# Patient Record
Sex: MALE | Race: BLACK OR AFRICAN AMERICAN | NOT HISPANIC OR LATINO | Employment: FULL TIME | URBAN - METROPOLITAN AREA
[De-identification: names, ages, dates, MRNs, and addresses within clinical notes are randomized per-mention and may not be internally consistent; named-entity substitution may affect disease eponyms.]

---

## 2017-09-06 ENCOUNTER — OFFICE VISIT (OUTPATIENT)
Dept: FAMILY MEDICINE | Facility: CLINIC | Age: 47
End: 2017-09-06
Payer: MEDICAID

## 2017-09-06 VITALS
SYSTOLIC BLOOD PRESSURE: 132 MMHG | OXYGEN SATURATION: 100 % | HEIGHT: 66 IN | BODY MASS INDEX: 29.57 KG/M2 | HEART RATE: 61 BPM | TEMPERATURE: 97.8 F | WEIGHT: 184 LBS | DIASTOLIC BLOOD PRESSURE: 82 MMHG

## 2017-09-06 DIAGNOSIS — Z00.00 ROUTINE GENERAL MEDICAL EXAMINATION AT A HEALTH CARE FACILITY: Primary | ICD-10-CM

## 2017-09-06 PROCEDURE — 99396 PREV VISIT EST AGE 40-64: CPT | Performed by: INTERNAL MEDICINE

## 2017-09-06 ASSESSMENT — PAIN SCALES - GENERAL: PAINLEVEL: NO PAIN (0)

## 2017-09-06 NOTE — NURSING NOTE
"Chief Complaint   Patient presents with     Physical       Initial /90 (BP Location: Right arm, Patient Position: Chair, Cuff Size: Adult Regular)  Pulse 61  Temp 97.8  F (36.6  C) (Oral)  Ht 5' 6\" (1.676 m)  Wt 184 lb (83.5 kg)  SpO2 100%  BMI 29.7 kg/m2 Estimated body mass index is 29.7 kg/(m^2) as calculated from the following:    Height as of this encounter: 5' 6\" (1.676 m).    Weight as of this encounter: 184 lb (83.5 kg).  Medication Reconciliation: complete   Amalia Tinoco MA      "

## 2017-09-06 NOTE — MR AVS SNAPSHOT
"              After Visit Summary   2017    Cornel Monte    MRN: [id number]           Patient Information     Date Of Birth          1970        Visit Information        Provider Department      2017 5:00 PM Nathan Carson MD [facility]         Follow-ups after your visit        Who to contact     If you have questions or need follow up information about today's clinic visit or your schedule please contact [facility] directly at [phone].  Normal or non-critical lab and imaging results will be communicated to you by MyChart, letter or phone within 4 business days after the clinic has received the results. If you do not hear from us within 7 days, please contact the clinic through [employer] or phone. If you have a critical or abnormal lab result, we will notify you by phone as soon as possible.  Submit refill requests through [employer] or call your pharmacy and they will forward the refill request to us. Please allow 3 business days for your refill to be completed.          Additional Information About Your Visit        MyChart Information     [employer] lets you send messages to your doctor, view your test results, renew your prescriptions, schedule appointments and more. To sign up, go to [email or web address] . Click on \"Log in\" on the left side of the screen, which will take you to the Welcome page. Then click on \"Sign up Now\" on the right side of the page.     You will be asked to enter the access code listed below, as well as some personal information. Please follow the directions to create your username and password.     Your access code is: [email or web address]  Expires: 2017  6:55 PM     Your access code will  in 90 days. If you need help or a new code, please call your [facility] or [phone].        Care EveryWhere ID     This is your Care EveryWhere ID. This could be used by other organizations to access your [place] " "medical records  [id number]        Your Vitals Were     Pulse Temperature Height Pulse Oximetry BMI (Body Mass Index)       61 97.8  F (36.6  C) (Oral) 5' 6\" (1.676 m) 100% 29.7 kg/m2        Blood Pressure from Last 3 Encounters:   09/06/17 132/82   09/02/16 126/78   04/23/15 133/86    Weight from Last 3 Encounters:   09/06/17 184 lb (83.5 kg)   09/02/16 184 lb (83.5 kg)   04/23/15 176 lb (79.8 kg)              Today, you had the following     No orders found for display       Primary Care Provider Office Phone # Fax #    Nathan Carson [phone]       [address] [facility] [address]        Equal Access to Services     LESLY WHEELER : Bhargav diazo Sowilliam, waaxda luqadaha, qaybta kaalmada adeamandayaregulo, kiel georges . So [facility] [phone].    ATENCIÓN: Si habla español, tiene a garcia disposición servicios gratuitos de asistencia lingüística. Sandrine al [phone].    We comply with applicable federal civil rights laws and [place] laws. We do not discriminate on the basis of race, color, national origin, age, disability sex, sexual orientation or gender identity.            Thank you!     Thank you for choosing [facility]  for your care. Our goal is always to provide you with excellent care. Hearing back from our patients is one way we can continue to improve our services. Please take a few minutes to complete the written survey that you may receive in the mail after your visit with us. Thank you!             Your Updated Medication List - Protect others around you: Learn how to safely use, store and throw away your medicines at www.disposemymeds.org.          This list is accurate as of: 9/6/17  6:55 PM.  Always use your most recent med list.                   Brand Name Dispense Instructions for use Diagnosis    acetaminophen 500 MG tablet    TYLENOL    100 tablet    Take 2 tablets (1,000 mg) by mouth every 6 hours as needed " for mild pain    Right ankle pain

## 2017-09-06 NOTE — PROGRESS NOTES
SUBJECTIVE:   CC: Cornel Monte is an 46 year old male who presents for preventative health visit.     Physical   Annual:     Getting at least 3 servings of Calcium per day::  Yes    Bi-annual eye exam::  Yes    Dental care twice a year::  NO    Sleep apnea or symptoms of sleep apnea::  None    Diet::  Regular (no restrictions)    Frequency of exercise::  1 day/week    Duration of exercise::  30-45 minutes    Taking medications regularly::  Yes    Medication side effects::  None    Additional concerns today::  No    Health doing well.        Today's PHQ-2 Score: PHQ-2 ( 1999 Pfizer) 9/6/2017   Q1: Little interest or pleasure in doing things 0   Q2: Feeling down, depressed or hopeless 0   PHQ-2 Score 0   Q1: Little interest or pleasure in doing things Not at all   Q2: Feeling down, depressed or hopeless Not at all   PHQ-2 Score 0       Abuse: Current or Past(Physical, Sexual or Emotional)- No  Do you feel safe in your environment - Yes    Social History   Substance Use Topics     Smoking status: Never Smoker     Smokeless tobacco: Never Used      Comment: smoke free household.     Alcohol use No     The patient does not drink >3 drinks per day nor >7 drinks per week.    Last PSA:   PSA   Date Value Ref Range Status   04/27/2009 0.82 0 - 4 ug/L Final       Reviewed orders with patient. Reviewed health maintenance and updated orders accordingly - Yes  Labs reviewed in EPIC  BP Readings from Last 3 Encounters:   09/06/17 132/82   09/02/16 126/78   04/23/15 133/86    Wt Readings from Last 3 Encounters:   09/06/17 184 lb (83.5 kg)   09/02/16 184 lb (83.5 kg)   04/23/15 176 lb (79.8 kg)                  Patient Active Problem List   Diagnosis     CARDIOVASCULAR SCREENING; LDL GOAL LESS THAN 160     Preglaucoma     Vitamin D deficiency disease     Past Surgical History:   Procedure Laterality Date     NO HISTORY OF SURGERY         Social History   Substance Use Topics     Smoking status: Never Smoker     Smokeless  tobacco: Never Used      Comment: smoke free household.     Alcohol use No     Family History   Problem Relation Age of Onset     Hypertension Father      Family History Negative Other      CANCER No family hx of      DIABETES No family hx of      CEREBROVASCULAR DISEASE No family hx of      Thyroid Disease No family hx of      Glaucoma No family hx of      Macular Degeneration No family hx of          Current Outpatient Prescriptions   Medication Sig Dispense Refill     acetaminophen (TYLENOL) 500 MG tablet Take 2 tablets (1,000 mg) by mouth every 6 hours as needed for mild pain 100 tablet 0           Reviewed and updated as needed this visit by clinical staffTobacco  Allergies  Meds  Med Hx  Surg Hx  Fam Hx  Soc Hx        Reviewed and updated as needed this visit by Provider              ROS:  C: NEGATIVE for fever, chills, change in weight  I: NEGATIVE for worrisome rashes, moles or lesions  E: NEGATIVE for vision changes or irritation  ENT: NEGATIVE for ear, mouth and throat problems  R: NEGATIVE for significant cough or SOB  CV: NEGATIVE for chest pain, palpitations or peripheral edema  GI: NEGATIVE for nausea, abdominal pain, heartburn, or change in bowel habits   male: negative for dysuria, hematuria, decreased urinary stream, erectile dysfunction, urethral discharge  M: NEGATIVE for significant arthralgias or myalgia  N: NEGATIVE for weakness, dizziness or paresthesias  P: NEGATIVE for changes in mood or affect    OBJECTIVE:   There were no vitals taken for this visit.    EXAM:  GENERAL: healthy, alert and no distress  NECK: no adenopathy, no asymmetry, masses, or scars and thyroid normal to palpation  RESP: lungs clear to auscultation - no rales, rhonchi or wheezes  CV: regular rate and rhythm, normal S1 S2, no S3 or S4, no murmur, click or rub, no peripheral edema and peripheral pulses strong  ABDOMEN: soft, nontender, no hepatosplenomegaly, no masses and bowel sounds normal  MS: no gross  "musculoskeletal defects noted, no edema    ASSESSMENT/PLAN:   No diagnosis found.    COUNSELING:   Reviewed preventive health counseling, as reflected in patient instructions       Regular exercise       Healthy diet/nutrition       Vision screening       Hearing screening         reports that he has never smoked. He has never used smokeless tobacco.      Estimated body mass index is 29.7 kg/(m^2) as calculated from the following:    Height as of 9/2/16: 5' 6\" (1.676 m).    Weight as of 9/2/16: 184 lb (83.5 kg).   Weight management plan: Discussed healthy diet and exercise guidelines and patient will follow up in 6 months in clinic to re-evaluate.    ICD-10-CM    1. Routine general medical examination at a health care facility Z00.00 OPTOMETRY REFERRAL       Counseling Resources:  ATP IV Guidelines  Pooled Cohorts Equation Calculator  FRAX Risk Assessment  ICSI Preventive Guidelines  Dietary Guidelines for Americans, 2010  USDA's MyPlate  ASA Prophylaxis  Lung CA Screening    Nathan Carson MD  [facility]  "

## 2018-09-10 ENCOUNTER — OFFICE VISIT (OUTPATIENT)
Dept: FAMILY MEDICINE | Facility: CLINIC | Age: 48
End: 2018-09-10
Payer: COMMERCIAL

## 2018-09-10 VITALS
WEIGHT: 182 LBS | DIASTOLIC BLOOD PRESSURE: 68 MMHG | BODY MASS INDEX: 29.25 KG/M2 | HEIGHT: 66 IN | OXYGEN SATURATION: 98 % | TEMPERATURE: 98.2 F | HEART RATE: 72 BPM | SYSTOLIC BLOOD PRESSURE: 126 MMHG

## 2018-09-10 DIAGNOSIS — Z00.00 ROUTINE GENERAL MEDICAL EXAMINATION AT A HEALTH CARE FACILITY: ICD-10-CM

## 2018-09-10 DIAGNOSIS — Z11.4 SCREENING FOR HUMAN IMMUNODEFICIENCY VIRUS: ICD-10-CM

## 2018-09-10 DIAGNOSIS — Z12.11 SPECIAL SCREENING FOR MALIGNANT NEOPLASMS, COLON: Primary | ICD-10-CM

## 2018-09-10 DIAGNOSIS — Z13.6 CARDIOVASCULAR SCREENING; LDL GOAL LESS THAN 160: ICD-10-CM

## 2018-09-10 DIAGNOSIS — N34.2 URETHRITIS: ICD-10-CM

## 2018-09-10 LAB
ALBUMIN UR-MCNC: NEGATIVE MG/DL
ANION GAP SERPL CALCULATED.3IONS-SCNC: 9 MMOL/L (ref 3–14)
APPEARANCE UR: CLEAR
BILIRUB UR QL STRIP: NEGATIVE
BUN SERPL-MCNC: 11 MG/DL (ref 7–30)
CALCIUM SERPL-MCNC: 9 MG/DL (ref 8.5–10.1)
CHLORIDE SERPL-SCNC: 104 MMOL/L (ref 94–109)
CO2 SERPL-SCNC: 25 MMOL/L (ref 20–32)
COLOR UR AUTO: YELLOW
CREAT SERPL-MCNC: 1.21 MG/DL (ref 0.66–1.25)
GFR SERPL CREATININE-BSD FRML MDRD: 64 ML/MIN/1.7M2
GLUCOSE SERPL-MCNC: 77 MG/DL (ref 70–99)
GLUCOSE UR STRIP-MCNC: NEGATIVE MG/DL
HGB UR QL STRIP: NEGATIVE
KETONES UR STRIP-MCNC: NEGATIVE MG/DL
LEUKOCYTE ESTERASE UR QL STRIP: NEGATIVE
NITRATE UR QL: NEGATIVE
PH UR STRIP: 7 PH (ref 5–7)
POTASSIUM SERPL-SCNC: 4.2 MMOL/L (ref 3.4–5.3)
SODIUM SERPL-SCNC: 138 MMOL/L (ref 133–144)
SOURCE: NORMAL
SP GR UR STRIP: 1.01 (ref 1–1.03)
UROBILINOGEN UR STRIP-ACNC: 1 EU/DL (ref 0.2–1)

## 2018-09-10 PROCEDURE — 87389 HIV-1 AG W/HIV-1&-2 AB AG IA: CPT | Performed by: INTERNAL MEDICINE

## 2018-09-10 PROCEDURE — 80048 BASIC METABOLIC PNL TOTAL CA: CPT | Performed by: INTERNAL MEDICINE

## 2018-09-10 PROCEDURE — 81003 URINALYSIS AUTO W/O SCOPE: CPT | Performed by: INTERNAL MEDICINE

## 2018-09-10 PROCEDURE — 36415 COLL VENOUS BLD VENIPUNCTURE: CPT | Performed by: INTERNAL MEDICINE

## 2018-09-10 PROCEDURE — 99396 PREV VISIT EST AGE 40-64: CPT | Performed by: INTERNAL MEDICINE

## 2018-09-10 ASSESSMENT — ENCOUNTER SYMPTOMS
DIZZINESS: 0
HEMATURIA: 0
ARTHRALGIAS: 0
PARESTHESIAS: 0
HEADACHES: 0
WEAKNESS: 0
NAUSEA: 0
ABDOMINAL PAIN: 0
NERVOUS/ANXIOUS: 0
DIARRHEA: 0
HEMATOCHEZIA: 0
SORE THROAT: 0
PALPITATIONS: 0
EYE PAIN: 0
CHILLS: 0
COUGH: 0
JOINT SWELLING: 0
SHORTNESS OF BREATH: 0
HEARTBURN: 0
FREQUENCY: 1
DYSURIA: 0
MYALGIAS: 0
CONSTIPATION: 0
FEVER: 0

## 2018-09-10 ASSESSMENT — PAIN SCALES - GENERAL: PAINLEVEL: NO PAIN (0)

## 2018-09-10 NOTE — LETTER
[facility]   [address] [place]  [address]  [phone]                                   September 11, 2018    Cornel Sureshlauren Monte  [address]  [facility] [address]        Dear Cornel,    Labs look great.  Continue current treatments.  No signs of diabetes or other ongoing disease.     Results for orders placed or performed in visit on 09/10/18   HIV Antigen Antibody Combo   Result Value Ref Range    HIV Antigen Antibody Combo Nonreactive NR^Nonreactive       Basic metabolic panel   Result Value Ref Range    Sodium 138 133 - 144 mmol/L    Potassium 4.2 3.4 - 5.3 mmol/L    Chloride 104 94 - 109 mmol/L    Carbon Dioxide 25 20 - 32 mmol/L    Anion Gap 9 3 - 14 mmol/L    Glucose 77 70 - 99 mg/dL    Urea Nitrogen 11 7 - 30 mg/dL    Creatinine 1.21 0.66 - 1.25 mg/dL    GFR Estimate 64 >60 mL/min/1.7m2    GFR Estimate If Black 77 >60 mL/min/1.7m2    Calcium 9.0 8.5 - 10.1 mg/dL   *UA reflex to Microscopic and Culture ([place] [facility] (except [place] and [place])   Result Value Ref Range    Color Urine Yellow     Appearance Urine Clear     Glucose Urine Negative NEG^Negative mg/dL    Bilirubin Urine Negative NEG^Negative    Ketones Urine Negative NEG^Negative mg/dL    Specific Gravity Urine 1.010 1.003 - 1.035    Blood Urine Negative NEG^Negative    pH Urine 7.0 5.0 - 7.0 pH    Protein Albumin Urine Negative NEG^Negative mg/dL    Urobilinogen Urine 1.0 0.2 - 1.0 EU/dL    Nitrite Urine Negative NEG^Negative    Leukocyte Esterase Urine Negative NEG^Negative    Source Midstream Urine        If you have any questions please call the clinic at [phone]    Sincerely,    Nathan Carson MD  bmd

## 2018-09-10 NOTE — PROGRESS NOTES
SUBJECTIVE:   CC: Cornel Monte is an 47 year old male who presents for preventative health visit.     Physical   Annual:     Getting at least 3 servings of Calcium per day:  Yes    Bi-annual eye exam:  Yes    Dental care twice a year:  Yes    Sleep apnea or symptoms of sleep apnea:  None    Diet:  Other    Frequency of exercise:  2-3 days/week    Duration of exercise:  30-45 minutes    Taking medications regularly:  Yes    Medication side effects:  None    Additional concerns today:  No            Today's PHQ-2 Score:   PHQ-2 ( 1999 Pfizer) 9/10/2018   Q1: Little interest or pleasure in doing things 0   Q2: Feeling down, depressed or hopeless 0   PHQ-2 Score 0   Q1: Little interest or pleasure in doing things Not at all   Q2: Feeling down, depressed or hopeless Not at all   PHQ-2 Score 0       Abuse: Current or Past(Physical, Sexual or Emotional)- No  Do you feel safe in your environment - Yes    Social History   Substance Use Topics     Smoking status: Never Smoker     Smokeless tobacco: Never Used      Comment: smoke free household.     Alcohol use No     Alcohol Use 9/10/2018   If you drink alcohol do you typically have greater than 3 drinks per day OR greater than 7 drinks per week? No   No flowsheet data found.    Last PSA:   PSA   Date Value Ref Range Status   04/27/2009 0.82 0 - 4 ug/L Final       Reviewed orders with patient. Reviewed health maintenance and updated orders accordingly - Yes  Labs reviewed in EPIC  BP Readings from Last 3 Encounters:   09/10/18 126/68   09/06/17 132/82   09/02/16 126/78    Wt Readings from Last 3 Encounters:   09/10/18 182 lb (82.6 kg)   09/06/17 184 lb (83.5 kg)   09/02/16 184 lb (83.5 kg)                  Patient Active Problem List   Diagnosis     CARDIOVASCULAR SCREENING; LDL GOAL LESS THAN 160     Preglaucoma     Vitamin D deficiency disease     Past Surgical History:   Procedure Laterality Date     NO HISTORY OF SURGERY         Social History   Substance Use  Topics     Smoking status: Never Smoker     Smokeless tobacco: Never Used      Comment: smoke free household.     Alcohol use No     Family History   Problem Relation Age of Onset     Hypertension Father      Family History Negative Other      Cancer No family hx of      Diabetes No family hx of      Cerebrovascular Disease No family hx of      Thyroid Disease No family hx of      Glaucoma No family hx of      Macular Degeneration No family hx of          Current Outpatient Prescriptions   Medication Sig Dispense Refill     acetaminophen (TYLENOL) 500 MG tablet Take 2 tablets (1,000 mg) by mouth every 6 hours as needed for mild pain (Patient not taking: Reported on 9/10/2018) 100 tablet 0     No Known Allergies    Reviewed and updated as needed this visit by clinical staff  Tobacco  Allergies  Meds  Med Hx  Surg Hx  Fam Hx  Soc Hx        Reviewed and updated as needed this visit by Provider            Review of Systems   Constitutional: Negative for chills and fever.   HENT: Negative for congestion, ear pain, hearing loss and sore throat.    Eyes: Positive for visual disturbance. Negative for pain.   Respiratory: Negative for cough and shortness of breath.    Cardiovascular: Negative for chest pain, palpitations and peripheral edema.   Gastrointestinal: Negative for abdominal pain, constipation, diarrhea, heartburn, hematochezia and nausea.   Genitourinary: Positive for frequency. Negative for discharge, dysuria, genital sores, hematuria, impotence and urgency.   Musculoskeletal: Negative for arthralgias, joint swelling and myalgias.   Skin: Negative for rash.   Neurological: Negative for dizziness, weakness, headaches and paresthesias.   Psychiatric/Behavioral: Negative for mood changes. The patient is not nervous/anxious.      CONSTITUTIONAL: NEGATIVE for fever, chills, change in weight  INTEGUMENTARY/SKIN: NEGATIVE for worrisome rashes, moles or lesions  EYES: NEGATIVE for vision changes or irritation  ENT:  "NEGATIVE for ear, mouth and throat problems  RESP: NEGATIVE for significant cough or SOB  CV: NEGATIVE for chest pain, palpitations or peripheral edema  GI: NEGATIVE for nausea, abdominal pain, heartburn, or change in bowel habits   male: negative for dysuria, hematuria, decreased urinary stream, erectile dysfunction, urethral discharge  MUSCULOSKELETAL: NEGATIVE for significant arthralgias or myalgia  NEURO: NEGATIVE for weakness, dizziness or paresthesias  PSYCHIATRIC: NEGATIVE for changes in mood or affect    OBJECTIVE:   /82 (BP Location: Right arm, Patient Position: Chair, Cuff Size: Adult Regular)  Pulse 72  Temp 98.2  F (36.8  C) (Oral)  Ht 5' 5.75\" (1.67 m)  Wt 182 lb (82.6 kg)  SpO2 98%  BMI 29.6 kg/m2    Physical Exam  GENERAL: healthy, alert and no distress  EYES: Eyes grossly normal to inspection, PERRL and conjunctivae and sclerae normal  HENT: ear canals and TM's normal, nose and mouth without ulcers or lesions  NECK: no adenopathy, no asymmetry, masses, or scars and thyroid normal to palpation  RESP: lungs clear to auscultation - no rales, rhonchi or wheezes  CV: regular rate and rhythm, normal S1 S2, no S3 or S4, no murmur, click or rub, no peripheral edema and peripheral pulses strong  ABDOMEN: soft, nontender, no hepatosplenomegaly, no masses and bowel sounds normal  MS: no gross musculoskeletal defects noted, no edema  SKIN: no suspicious lesions or rashes  NEURO: Normal strength and tone, mentation intact and speech normal  BACK: no CVA tenderness, no paralumbar tenderness  PSYCH: mentation appears normal, affect normal/bright  LYMPH: no cervical, supraclavicular, axillary, or inguinal adenopathy    Diagnostic Test Results:  Results for orders placed or performed in visit on 09/10/18   Basic metabolic panel   Result Value Ref Range    Sodium 138 133 - 144 mmol/L    Potassium 4.2 3.4 - 5.3 mmol/L    Chloride 104 94 - 109 mmol/L    Carbon Dioxide 25 20 - 32 mmol/L    Anion Gap 9 3 - " "14 mmol/L    Glucose 77 70 - 99 mg/dL    Urea Nitrogen 11 7 - 30 mg/dL    Creatinine 1.21 0.66 - 1.25 mg/dL    GFR Estimate 64 >60 mL/min/1.7m2    GFR Estimate If Black 77 >60 mL/min/1.7m2    Calcium 9.0 8.5 - 10.1 mg/dL   *UA reflex to Microscopic and Culture ([facility] (except [place] and [place])   Result Value Ref Range    Color Urine Yellow     Appearance Urine Clear     Glucose Urine Negative NEG^Negative mg/dL    Bilirubin Urine Negative NEG^Negative    Ketones Urine Negative NEG^Negative mg/dL    Specific Gravity Urine 1.010 1.003 - 1.035    Blood Urine Negative NEG^Negative    pH Urine 7.0 5.0 - 7.0 pH    Protein Albumin Urine Negative NEG^Negative mg/dL    Urobilinogen Urine 1.0 0.2 - 1.0 EU/dL    Nitrite Urine Negative NEG^Negative    Leukocyte Esterase Urine Negative NEG^Negative    Source Midstream Urine        ASSESSMENT/PLAN:       ICD-10-CM    1. Special screening for malignant neoplasms, colon Z12.11 GASTROENTEROLOGY ADULT REF PROCEDURE ONLY Other; [place] [facility] [phone]   2. CARDIOVASCULAR SCREENING; LDL GOAL LESS THAN 160 Z13.6 Lipid panel reflex to direct LDL Fasting   3. Screening for human immunodeficiency virus Z11.4 HIV Antigen Antibody Combo   4. Routine general medical examination at a health care facility Z00.00 OPTOMETRY REFERRAL     Basic metabolic panel   5. Urethritis N34.2 *UA reflex to Microscopic and Culture ([facility] (except [place] and [place])       COUNSELING:   Reviewed preventive health counseling, as reflected in patient instructions       Consider AAA screening for ages 65-75 and smoking history       Regular exercise       Healthy diet/nutrition       Vision screening    BP Readings from Last 1 Encounters:   09/10/18 153/82     Estimated body mass index is 29.6 kg/(m^2) as calculated from the following:    Height as of this encounter: 5' 5.75\" (1.67 m).    Weight as of this encounter: 182 lb (82.6 kg).      Weight management plan: " Patient referred to endocrine and/or weight management specialty     reports that he has never smoked. He has never used smokeless tobacco.      ICD-10-CM    1. Special screening for malignant neoplasms, colon Z12.11 GASTROENTEROLOGY ADULT REF PROCEDURE ONLY Other; [facility] [phone]   2. CARDIOVASCULAR SCREENING; LDL GOAL LESS THAN 160 Z13.6 Lipid panel reflex to direct LDL Fasting   3. Screening for human immunodeficiency virus Z11.4 HIV Antigen Antibody Combo   4. Routine general medical examination at a health care facility Z00.00 OPTOMETRY REFERRAL     Basic metabolic panel   5. Urethritis N34.2 *UA reflex to Microscopic and Culture ([place] [facility] (except [place] and [place])       Counseling Resources:  ATP IV Guidelines  Pooled Cohorts Equation Calculator  FRAX Risk Assessment  ICSI Preventive Guidelines  Dietary Guidelines for Americans, 2010  USDA's MyPlate  ASA Prophylaxis  Lung CA Screening    Nathan Carson MD  [facility]  Answers for HPI/ROS submitted by the patient on 9/10/2018   PHQ-2 Score: 0

## 2018-09-10 NOTE — MR AVS SNAPSHOT
After Visit Summary   9/10/2018    Cornel Monte    MRN: [id number]           Patient Information     Date Of Birth          1970        Visit Information        Provider Department      9/10/2018 3:00 PM Nathan Carson MD [facility]        Today's Diagnoses     Special screening for malignant neoplasms, colon    -  1    CARDIOVASCULAR SCREENING; LDL GOAL LESS THAN 160        Screening for human immunodeficiency virus        Routine general medical examination at a health care facility        Urethritis          Care Instructions      Preventive Health Recommendations  Male Ages 40 to 49    Yearly exam:             See your health care provider every year in order to  o   Review health changes.   o   Discuss preventive care.    o   Review your medicines if your doctor has prescribed any.    You should be tested each year for STDs (sexually transmitted diseases) if you re at risk.     Have a cholesterol test every 5 years.     Have a colonoscopy (test for colon cancer) if someone in your family has had colon cancer or polyps before age 50.     After age 45, have a diabetes test (fasting glucose). If you are at risk for diabetes, you should have this test every 3 years.      Talk with your health care provider about whether or not a prostate cancer screening test (PSA) is right for you.    Shots: Get a flu shot each year. Get a tetanus shot every 10 years.     Nutrition:    Eat at least 5 servings of fruits and vegetables daily.     Eat whole-grain bread, whole-wheat pasta and brown rice instead of white grains and rice.     Get adequate Calcium and Vitamin D.     Lifestyle    Exercise for at least 150 minutes a week (30 minutes a day, 5 days a week). This will help you control your weight and prevent disease.     Limit alcohol to one drink per day.     No smoking.     Wear sunscreen to prevent skin cancer.     See your dentist every six months for an exam and  cleaning.              Follow-ups after your visit        Additional Services     GASTROENTEROLOGY ADULT REF PROCEDURE ONLY Other; [facility] [phone]       Last Lab Result: Creatinine (mg/dL)       Date                     Value                 09/02/2016               1.27 (H)         ----------  Body mass index is 29.6 kg/(m^2).     Needed:  No  Language:  Japanese    Patient will be contacted to schedule procedure.     Please be aware that coverage of these services is subject to the terms and limitations of your health insurance plan.  Call member services at your health plan with any benefit or coverage questions.  Any procedures must be performed at a [facility] facility OR coordinated by your clinic's referral office.    Please bring the following with you to your appointment:    (1) Any X-Rays, CTs or MRIs which have been performed.  Contact the facility where they were done to arrange for  prior to your scheduled appointment.    (2) List of current medications   (3) This referral request   (4) Any documents/labs given to you for this referral            OPTOMETRY REFERRAL       Your provider has referred you to:  [facility] [facility] [phone]    [email or web address]      Please be aware that coverage of these services is subject to the terms and limitations of your health insurance plan.  Call member services at your health plan with any benefit or coverage questions.      Please bring the following to your appointment:  >>   Any x-rays, CTs or MRIs which have been performed.  Contact the facility where they were done to arrange for  prior to your scheduled appointment.  Any new CT, MRI or other procedures ordered by your specialist must be performed at a [facility] facility or coordinated by your clinic's referral office.    >>   List of current medications   >>   This referral request   >>   Any documents/labs given to you for this referral  "                 Your next 10 appointments already scheduled     Sep 17, 2018  6:00 PM CDT   New Visit with Cookie Suero OD   [facility] ([facility])    [address] [facility] [address] [phone]              Future tests that were ordered for you today     Open Future Orders        Priority Expected Expires Ordered    Lipid panel reflex to direct LDL Fasting Routine 9/10/2018 9/10/2019 9/10/2018            Who to contact     If you have questions or need follow up information about today's clinic visit or your schedule please contact [facility] directly at [phone].  Normal or non-critical lab and imaging results will be communicated to you by MyChart, letter or phone within 4 business days after the clinic has received the results. If you do not hear from us within 7 days, please contact the clinic through MyChart or phone. If you have a critical or abnormal lab result, we will notify you by phone as soon as possible.  Submit refill requests through inSparq or call your pharmacy and they will forward the refill request to us. Please allow 3 business days for your refill to be completed.          Additional Information About Your Visit        Care EveryWhere ID     This is your Care EveryWhere ID. This could be used by other organizations to access your [place] medical records  [id number]        Your Vitals Were     Pulse Temperature Height Pulse Oximetry BMI (Body Mass Index)       72 98.2  F (36.8  C) (Oral) 5' 5.75\" (1.67 m) 98% 29.6 kg/m2        Blood Pressure from Last 3 Encounters:   09/10/18 126/68   09/06/17 132/82   09/02/16 126/78    Weight from Last 3 Encounters:   09/10/18 182 lb (82.6 kg)   09/06/17 184 lb (83.5 kg)   09/02/16 184 lb (83.5 kg)              We Performed the Following     *UA reflex to Microscopic and Culture ([place] and [facility] (except [place] and [place])     Basic metabolic panel     " GASTROENTEROLOGY ADULT REF PROCEDURE ONLY Other; [facility] [phone]     HIV Antigen Antibody Combo     OPTOMETRY REFERRAL        Primary Care Provider Office Phone # Fax #    Nathan Carson [phone]       [address] [facility] [address]        Equal Access to Services     LESLY WHEELER : Hadii aad ku hadasho Soomaali, waaxda luqadaha, qaybta kaalmada adeegyada, waxay idiin hayaan adeeg kharash la'aan ah. So [facility] [phone].    ATENCIÓN: Si habla español, tiene a garcia disposición servicios gratuitos de asistencia lingüística. Llame al [phone].    We comply with applicable federal civil rights laws and [place] laws. We do not discriminate on the basis of race, color, national origin, age, disability, sex, sexual orientation, or gender identity.            Thank you!     Thank you for choosing [facility]  for your care. Our goal is always to provide you with excellent care. Hearing back from our patients is one way we can continue to improve our services. Please take a few minutes to complete the written survey that you may receive in the mail after your visit with us. Thank you!             Your Updated Medication List - Protect others around you: Learn how to safely use, store and throw away your medicines at www.disposemymeds.org.          This list is accurate as of 9/10/18  4:04 PM.  Always use your most recent med list.                   Brand Name Dispense Instructions for use Diagnosis    acetaminophen 500 MG tablet    TYLENOL    100 tablet    Take 2 tablets (1,000 mg) by mouth every 6 hours as needed for mild pain    Right ankle pain

## 2018-09-11 LAB — HIV 1+2 AB+HIV1 P24 AG SERPL QL IA: NONREACTIVE

## 2018-09-17 ENCOUNTER — OFFICE VISIT (OUTPATIENT)
Dept: OPTOMETRY | Facility: CLINIC | Age: 48
End: 2018-09-17
Payer: COMMERCIAL

## 2018-09-17 DIAGNOSIS — H52.223 REGULAR ASTIGMATISM OF BOTH EYES: Primary | ICD-10-CM

## 2018-09-17 DIAGNOSIS — H47.393 CUP TO DISC ASYMMETRY, BILATERAL: ICD-10-CM

## 2018-09-17 DIAGNOSIS — H52.4 PRESBYOPIA: ICD-10-CM

## 2018-09-17 PROCEDURE — 92004 COMPRE OPH EXAM NEW PT 1/>: CPT | Performed by: OPTOMETRIST

## 2018-09-17 PROCEDURE — 92015 DETERMINE REFRACTIVE STATE: CPT | Performed by: OPTOMETRIST

## 2018-09-17 ASSESSMENT — REFRACTION_MANIFEST
METHOD_AUTOREFRACTION: 1
OD_CYLINDER: +0.75
OD_SPHERE: -0.75
OS_CYLINDER: +1.00
OD_SPHERE: -0.50
OD_AXIS: 174
OS_AXIS: 010
OS_CYLINDER: +0.75
OD_AXIS: 005
OS_SPHERE: -0.25
OD_CYLINDER: +0.75
OS_SPHERE: -0.50
OS_AXIS: 030

## 2018-09-17 ASSESSMENT — KERATOMETRY
OS_K2POWER_DIOPTERS: 44.25
OD_K2POWER_DIOPTERS: 43.25
OS_K1POWER_DIOPTERS: 43.50
OS_AXISANGLE2_DEGREES: 142
OD_K1POWER_DIOPTERS: 43.50
OD_AXISANGLE2_DEGREES: 151

## 2018-09-17 ASSESSMENT — CONF VISUAL FIELD
METHOD: COUNTING FINGERS
OS_NORMAL: 1
OD_NORMAL: 1

## 2018-09-17 ASSESSMENT — TONOMETRY
OD_IOP_MMHG: 20
OS_IOP_MMHG: 20
IOP_METHOD: APPLANATION

## 2018-09-17 ASSESSMENT — PACHYMETRY
OS_CT(UM): .586
OD_CT(UM): .586

## 2018-09-17 ASSESSMENT — REFRACTION_WEARINGRX
OS_SPHERE: PLANO
OD_CYLINDER: SPHERE
OS_CYLINDER: +0.25
OS_AXIS: 180
OD_SPHERE: -0.25
SPECS_TYPE: SINGLE VISION

## 2018-09-17 ASSESSMENT — VISUAL ACUITY
OS_PH_SC: 20/20
OS_SC: 20/40
OS_SC: 20/200
OD_SC: 20/70-1
METHOD: SNELLEN - LINEAR
OD_SC+: -2
OD_SC: 20/25
OS_SC+: +1

## 2018-09-17 ASSESSMENT — EXTERNAL EXAM - RIGHT EYE: OD_EXAM: NORMAL

## 2018-09-17 ASSESSMENT — CUP TO DISC RATIO
OD_RATIO: 0.6
OS_RATIO: 0.5

## 2018-09-17 ASSESSMENT — EXTERNAL EXAM - LEFT EYE: OS_EXAM: NORMAL

## 2018-09-17 NOTE — PROGRESS NOTES
Chief Complaint   Patient presents with     COMPREHENSIVE EYE EXAM         Last Eye Exam: 4/12/2013 with Dr. Can , Dr Connelly 2013, GDX normal  Dilated Previously: Yes    What are you currently using to see?  Patient said that he wore glasses when he was younger, a kid        Distance Vision Acuity: Satisfied with vision, no problems or changes    Near Vision Acuity: Not satisfied, has trouble reading. Said that his left eye seems worse, and he said that it is worse in the morning and when he's tired     Eye Comfort: good- left eye claudio when reading   Do you use eye drops? : No  Occupation or Hobbies: Engineering , has second job 2 nights a week    Kristina Apple Optometric Assistant           Medical, surgical and family histories reviewed and updated 9/17/2018.       OBJECTIVE: See Ophthalmology exam    ASSESSMENT:    ICD-10-CM    1. Regular astigmatism of both eyes H52.223 EYE EXAM (SIMPLE-NONBILLABLE)     REFRACTION   2. Presbyopia H52.4 EYE EXAM (SIMPLE-NONBILLABLE)     REFRACTION   3. 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  Fill reading glasses prescription  Allow 2 weeks to adapt to glasses  Return in 1 year for eye exam    Cookie Suero O.D.  [facility]   [address], [place] [address] [phone]

## 2018-09-17 NOTE — LETTER
9/17/2018         RE: Cornel Monte  [address] [facility] [address]        Dear Colleague,    Thank you for referring your patient, Cornel Monte, to the [facility]. Please see a copy of my visit note below.    Chief Complaint   Patient presents with     COMPREHENSIVE EYE EXAM         Last Eye Exam: 4/12/2013 with Dr. Can , Dr Connelly 2013, GDX normal  Dilated Previously: Yes    What are you currently using to see?  Patient said that he wore glasses when he was younger, a kid        Distance Vision Acuity: Satisfied with vision, no problems or changes    Near Vision Acuity: Not satisfied, has trouble reading. Said that his left eye seems worse, and he said that it is worse in the morning and when he's tired     Eye Comfort: good- left eye claudio when reading   Do you use eye drops? : No  Occupation or Hobbies: Engineering , has second job 2 nights a week    Kristina Apple Optometric Assistant           Medical, surgical and family histories reviewed and updated 9/17/2018.       OBJECTIVE: See Ophthalmology exam    ASSESSMENT:    ICD-10-CM    1. Regular astigmatism of both eyes H52.223 EYE EXAM (SIMPLE-NONBILLABLE)     REFRACTION   2. Presbyopia H52.4 EYE EXAM (SIMPLE-NONBILLABLE)     REFRACTION   3. 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  Fill reading glasses prescription  Allow 2 weeks to adapt to glasses  Return in 1 year for eye exam    Cookie Suero O.D.  [facility]   [address] [place], [place] [id number] [phone]           Again, thank you for allowing me to participate in the care of your patient.        Sincerely,        Cookie Suero, OD

## 2018-09-18 NOTE — PATIENT INSTRUCTIONS
Patient was advised of today's exam findings.  Fill reading glasses prescription  Allow 2 weeks to adapt to glasses  Return in 1 year for eye exam    Cookie Suero O.D.  [facility]   [address] [place] [address] [phone]

## 2018-09-19 PROBLEM — H47.393 CUP TO DISC ASYMMETRY, BILATERAL: Status: ACTIVE | Noted: 2018-09-19

## 2018-10-15 ASSESSMENT — REFRACTION_MANIFEST
OD_ADD: +1.25
OS_ADD: +1.25

## 2019-04-11 ENCOUNTER — OFFICE VISIT (OUTPATIENT)
Dept: OPTOMETRY | Facility: CLINIC | Age: 49
End: 2019-04-11
Payer: COMMERCIAL

## 2019-04-11 DIAGNOSIS — H34.8120 CENTRAL RETINAL VEIN OCCLUSION WITH MACULAR EDEMA OF LEFT EYE (H): Primary | ICD-10-CM

## 2019-04-11 DIAGNOSIS — H40.053 BORDERLINE GLAUCOMA WITH OCULAR HYPERTENSION, BILATERAL: ICD-10-CM

## 2019-04-11 PROCEDURE — 99213 OFFICE O/P EST LOW 20 MIN: CPT | Performed by: OPTOMETRIST

## 2019-04-11 ASSESSMENT — REFRACTION_WEARINGRX
SPECS_TYPE: SVL DISTANCE
OD_AXIS: 005
OS_AXIS: 010
OS_SPHERE: -0.50
OD_SPHERE: -0.75
OD_CYLINDER: +0.75
OS_CYLINDER: +1.00
OD_AXIS: 005
OD_CYLINDER: +0.75
OD_SPHERE: -0.75
SPECS_TYPE: READING GLASSES
OS_AXIS: 010
OS_CYLINDER: +1.00
OS_SPHERE: -0.50

## 2019-04-11 ASSESSMENT — REFRACTION_MANIFEST
METHOD_AUTOREFRACTION: 1
OD_CYLINDER: +0.75
OS_CYLINDER: +0.75
OD_AXIS: 160
OD_SPHERE: -0.50
OS_AXIS: 1
OS_SPHERE: 0.00

## 2019-04-11 ASSESSMENT — PACHYMETRY
OS_CT(UM): .586
OD_CT(UM): .586

## 2019-04-11 ASSESSMENT — KERATOMETRY
OD_K1POWER_DIOPTERS: 43.25
OD_K2POWER_DIOPTERS: 43.75
OD_AXISANGLE2_DEGREES: 34
OS_AXISANGLE2_DEGREES: 33
OS_K1POWER_DIOPTERS: 43.75
OS_K2POWER_DIOPTERS: 43.25

## 2019-04-11 ASSESSMENT — VISUAL ACUITY
OD_SC+: -1
OD_SC: 20/25
METHOD: SNELLEN - LINEAR
OS_SC: 20/150

## 2019-04-11 ASSESSMENT — TONOMETRY
IOP_METHOD: APPLANATION
OS_IOP_MMHG: 25
OD_IOP_MMHG: 25

## 2019-04-11 ASSESSMENT — CUP TO DISC RATIO
OS_RATIO: 0.5
OD_RATIO: 0.6

## 2019-04-11 ASSESSMENT — EXTERNAL EXAM - LEFT EYE: OS_EXAM: NORMAL

## 2019-04-11 ASSESSMENT — EXTERNAL EXAM - RIGHT EYE: OD_EXAM: NORMAL

## 2019-04-11 NOTE — PROGRESS NOTES
Chief Complaint   Patient presents with     Eye Problem Left Eye       He does have two separate pair of glasses that he uses. One for reading and one for distance that he uses more for the sunclip than the vision. He said that he doesn't see any differently with or without them.        Kristina Werner Optometric Assistant      See Review Of Systems       Medical, surgical and family histories reviewed and updated 4/11/2019.         OBJECTIVE: See Ophthalmology exam    ASSESSMENT:    ICD-10-CM    1. Central retinal vein occlusion with macular edema of left eye H34.8120 OPHTHALMOLOGY ADULT REFERRAL     OPHTHALMOLOGY ADULT REFERRAL   2. Borderline glaucoma with ocular hypertension, bilateral H40.053 OPHTHALMOLOGY ADULT REFERRAL     OPHTHALMOLOGY ADULT REFERRAL      PLAN:    Patient Instructions   Patient was advised of today's exam findings.  Refer to [facility] in [place] for retinal evaluation  Refer to Dr Connelly at [facility] at [place] for glaucoma evaluation    Cookie Suero O.D.  [facility]   [address] [place], [place] [id number] [phone]

## 2019-04-11 NOTE — LETTER
[facility]  [address] [place] [address]  [phone]  Fax [phone]    2019     [facility]  [address], [address]  [place] [address]  [phone], FAX: [phone]    Regarding   Cornel Monte      :    1970    MR#:    [id number]   Apt:   Please call [phone] to schedule appointment     Dear Dr. PRATT  Provider    I advised Cornel Monte to see you for evaluation of left central vein occlusion with macular involvement.  He was seen 2019 because he noticed faded vision with the left eye for 2 months. I have also referred him to Dr Connelly for glaucoma evaluation.    Best corrected acuity was R 20/25, L 20/150.  Thank you for seeing this patient.    Sincerely,                    Cookie Suero O.D

## 2019-04-11 NOTE — PATIENT INSTRUCTIONS
Patient was advised of today's exam findings.  Refer to [facility] in [place] for retinal evaluation  Refer to Dr Connelly at [facility] at [place] for glaucoma evaluation    Cookie Suero O.D.  [facility]   [address] [place], [place] [id number] [phone]

## 2019-04-11 NOTE — LETTER
4/11/2019         RE: Cornel Monte  [address] [facility] [address]        Dear Colleague,    Your patient, Cornel Monte, was seen at  [facility]. I referred him to see retinal specialist at [facility] due to retinal vein occlusion, and to Dr Connelly for glaucoma testing.  Please see a copy of my visit note below.    Chief Complaint   Patient presents with     Eye Problem Left Eye       He does have two separate pair of glasses that he uses. One for reading and one for distance that he uses more for the sunclip than the vision. He said that he doesn't see any differently with or without them.        Kristina Apple Optometric Assistant      See Review Of Systems       Medical, surgical and family histories reviewed and updated 4/11/2019.         OBJECTIVE: See Ophthalmology exam    ASSESSMENT:    ICD-10-CM    1. Central retinal vein occlusion with macular edema of left eye H34.8120 OPHTHALMOLOGY ADULT REFERRAL     OPHTHALMOLOGY ADULT REFERRAL   2. Borderline glaucoma with ocular hypertension, bilateral H40.053 OPHTHALMOLOGY ADULT REFERRAL     OPHTHALMOLOGY ADULT REFERRAL      PLAN:    Patient Instructions   Patient was advised of today's exam findings.  Refer to [facility] in [place] for retinal evaluation  Refer to Dr Connelly at [facility] at [place] for glaucoma evaluation    Cookie Suero O.D.  [facility]   [address] [place] [address] [phone]           Again, thank you for allowing me to participate in the care of your patient.        Sincerely,        Cookie Suero, OD

## 2019-04-15 ENCOUNTER — TRANSFERRED RECORDS (OUTPATIENT)
Dept: HEALTH INFORMATION MANAGEMENT | Facility: CLINIC | Age: 49
End: 2019-04-15

## 2019-04-16 PROBLEM — H34.8320 BRANCH RETINAL VEIN OCCLUSION OF LEFT EYE WITH MACULAR EDEMA (H): Status: ACTIVE | Noted: 2019-04-16

## 2019-05-06 ENCOUNTER — OFFICE VISIT (OUTPATIENT)
Dept: OPHTHALMOLOGY | Facility: CLINIC | Age: 49
End: 2019-05-06
Payer: COMMERCIAL

## 2019-05-06 DIAGNOSIS — H34.8320 BRANCH RETINAL VEIN OCCLUSION OF LEFT EYE WITH MACULAR EDEMA (H): ICD-10-CM

## 2019-05-06 DIAGNOSIS — H40.003 GLAUCOMA SUSPECT, BILATERAL: Primary | ICD-10-CM

## 2019-05-06 PROCEDURE — 92002 INTRM OPH EXAM NEW PATIENT: CPT | Performed by: STUDENT IN AN ORGANIZED HEALTH CARE EDUCATION/TRAINING PROGRAM

## 2019-05-06 PROCEDURE — 92083 EXTENDED VISUAL FIELD XM: CPT | Performed by: STUDENT IN AN ORGANIZED HEALTH CARE EDUCATION/TRAINING PROGRAM

## 2019-05-06 PROCEDURE — 92133 CPTRZD OPH DX IMG PST SGM ON: CPT | Performed by: STUDENT IN AN ORGANIZED HEALTH CARE EDUCATION/TRAINING PROGRAM

## 2019-05-06 ASSESSMENT — VISUAL ACUITY
OS_SC: 20/40-2
METHOD: SNELLEN - LINEAR
OD_SC: 20/20
OS_PH_SC: 20/25

## 2019-05-06 ASSESSMENT — TONOMETRY
OS_IOP_MMHG: 18
OD_IOP_MMHG: 20
IOP_METHOD: APPLANATION

## 2019-05-06 ASSESSMENT — PACHYMETRY
OD_CT(UM): .594
OS_CT(UM): .596

## 2019-05-06 NOTE — LETTER
5/6/2019         RE: Cornel Monte  [address] [facility] [address]        Dear Colleague,    Thank you for referring your patient, Cornel Monte, to the [facility]. Please see a copy of my visit note below.     Current Eye Medications:  Refresh prn     Subjective:  Glaucoma evaluation  Pt reports his eyes will water a lot and has bifocals at home that do not work very well for his reading.  Last eye exam at [facility] was with Dr. Connelly in May 2013 (has seen Dr. Suero recently).     Objective:  See Ophthalmology Exam.      Assessment:  Cornel Monte is a 48 year old male who presents with:   Encounter Diagnoses   Name Primary?     Glaucoma suspect, bilateral Davidson visual field (HVF): mild scattered loss with high fixation losses right eye; mild paracentral loss left eye     OCT optic nerve: within normal limits both eyes (increased retinal nerve fiber layer superiorly in the left eye related to fredo-retinal vein occlusion.     Intraocular pressure 20/18 today with thick central corneal thickness (594/596).    Continue monitoring.       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 Chavez for Avastin q4w       Plan:  Continue monitoring for glaucoma suspicion    Continue seeing Dr. Chavez at S    Bhavesh Kim MD  [phone]        Again, thank you for allowing me to participate in the care of your patient.        Sincerely,        Bhavesh Kim MD

## 2019-05-06 NOTE — PATIENT INSTRUCTIONS
Continue monitoring for glaucoma suspicion    Continue seeing Dr. Chavez at [facility]    Bhavesh Kim MD  [phone]

## 2019-05-06 NOTE — PROGRESS NOTES
Current Eye Medications:  Refresh prn     Subjective:  Glaucoma evaluation  Pt reports his eyes will water a lot and has bifocals at home that do not work very well for his reading.  [facility] with Dr. Connelly in May 2013 (has seen Dr. Suero recently).     Objective:  See Ophthalmology Exam.      Assessment:  Cornel Monte is a 48 year old male who presents with:   Encounter Diagnoses   Name Primary?     Glaucoma suspect, bilateral Davidson visual field (HVF): mild scattered loss with high fixation losses right eye; mild paracentral loss left eye     OCT optic nerve: within normal limits both eyes (increased retinal nerve fiber layer superiorly in the left eye related to fredo-retinal vein occlusion.     Intraocular pressure 20/18 today with thick central corneal thickness (594/596).    Continue monitoring.       Fredo-retinal vein occlusion of left eye with macular edema- sees [facility] Dr Marisa Chavez Sees Dr. Chavez for Avastin q4w       Plan:  Continue monitoring for glaucoma suspicion    Continue seeing Dr. Chavez at [facility]    Bhavesh Kim MD  [phone]

## 2019-05-27 ASSESSMENT — EXTERNAL EXAM - RIGHT EYE: OD_EXAM: NORMAL

## 2019-05-27 ASSESSMENT — CUP TO DISC RATIO
OS_RATIO: 0.5
OD_RATIO: 0.6

## 2019-05-27 ASSESSMENT — EXTERNAL EXAM - LEFT EYE: OS_EXAM: NORMAL

## 2019-07-22 ENCOUNTER — TRANSFERRED RECORDS (OUTPATIENT)
Dept: HEALTH INFORMATION MANAGEMENT | Facility: CLINIC | Age: 49
End: 2019-07-22

## 2019-08-30 ENCOUNTER — TRANSFERRED RECORDS (OUTPATIENT)
Dept: HEALTH INFORMATION MANAGEMENT | Facility: CLINIC | Age: 49
End: 2019-08-30

## 2019-09-25 ENCOUNTER — OFFICE VISIT (OUTPATIENT)
Dept: FAMILY MEDICINE | Facility: CLINIC | Age: 49
End: 2019-09-25
Payer: COMMERCIAL

## 2019-09-25 VITALS
HEART RATE: 63 BPM | BODY MASS INDEX: 30.25 KG/M2 | WEIGHT: 186 LBS | OXYGEN SATURATION: 96 % | DIASTOLIC BLOOD PRESSURE: 90 MMHG | SYSTOLIC BLOOD PRESSURE: 158 MMHG

## 2019-09-25 DIAGNOSIS — I10 HTN, GOAL BELOW 140/90: Primary | ICD-10-CM

## 2019-09-25 DIAGNOSIS — H40.002 PREGLAUCOMA OF LEFT EYE: ICD-10-CM

## 2019-09-25 PROCEDURE — 80061 LIPID PANEL: CPT | Performed by: INTERNAL MEDICINE

## 2019-09-25 PROCEDURE — 84443 ASSAY THYROID STIM HORMONE: CPT | Performed by: INTERNAL MEDICINE

## 2019-09-25 PROCEDURE — 36415 COLL VENOUS BLD VENIPUNCTURE: CPT | Performed by: INTERNAL MEDICINE

## 2019-09-25 PROCEDURE — 99396 PREV VISIT EST AGE 40-64: CPT | Performed by: INTERNAL MEDICINE

## 2019-09-25 PROCEDURE — 80048 BASIC METABOLIC PNL TOTAL CA: CPT | Performed by: INTERNAL MEDICINE

## 2019-09-25 RX ORDER — METOPROLOL SUCCINATE 25 MG/1
25 TABLET, EXTENDED RELEASE ORAL DAILY
Qty: 90 TABLET | Refills: 3 | Status: SHIPPED | OUTPATIENT
Start: 2019-09-25 | End: 2019-12-26

## 2019-09-25 ASSESSMENT — ENCOUNTER SYMPTOMS
HEMATOCHEZIA: 0
DIARRHEA: 0
COUGH: 0
CHILLS: 0
CONSTIPATION: 0
DIZZINESS: 0
FREQUENCY: 0
FEVER: 0
NERVOUS/ANXIOUS: 0
ABDOMINAL PAIN: 0
HEMATURIA: 0
EYE PAIN: 0

## 2019-09-25 NOTE — LETTER
[facility]   [address] [place]  [address]  [phone]                                   October 8, 2019    Cornel Sureshlauren Hajadevynjean paul  [address]  [facility] [address]        Dear Cornel,    Cholesterol is up.  Watch fats, oils and sugars in the diet.  Get some excercside through the day    Results for orders placed or performed in visit on 09/25/19   Basic metabolic panel   Result Value Ref Range    Sodium 139 133 - 144 mmol/L    Potassium 4.1 3.4 - 5.3 mmol/L    Chloride 106 94 - 109 mmol/L    Carbon Dioxide 25 20 - 32 mmol/L    Anion Gap 8 3 - 14 mmol/L    Glucose 73 70 - 99 mg/dL    Urea Nitrogen 18 7 - 30 mg/dL    Creatinine 1.20 0.66 - 1.25 mg/dL    GFR Estimate 71 >60 mL/min/[1.73_m2]    GFR Estimate If Black 82 >60 mL/min/[1.73_m2]    Calcium 8.4 (L) 8.5 - 10.1 mg/dL   Lipid panel reflex to direct LDL Fasting   Result Value Ref Range    Cholesterol 229 (H) <200 mg/dL    Triglycerides 139 <150 mg/dL    HDL Cholesterol 55 >39 mg/dL    LDL Cholesterol Calculated 146 (H) <100 mg/dL    Non HDL Cholesterol 174 (H) <130 mg/dL   TSH with free T4 reflex   Result Value Ref Range    TSH 2.33 0.40 - 4.00 mU/L       If you have any questions please call the clinic at [phone]    Sincerely,    Nathan Carson MD  hnr

## 2019-09-25 NOTE — PROGRESS NOTES
SUBJECTIVE:   CC: Cornel Monte is an 48 year old male who presents for preventative health visit.     Healthy Habits:     Getting at least 3 servings of Calcium per day:  Yes    Bi-annual eye exam:  Yes    Dental care twice a year:  NO    Sleep apnea or symptoms of sleep apnea:  Excessive snoring    Diet:  Regular (no restrictions), Breakfast skipped and Other    Frequency of exercise:  1 day/week    Duration of exercise:  Less than 15 minutes    Taking medications regularly:  Yes    Medication side effects:  None    PHQ-2 Total Score: 0    Additional concerns today:  No    glaucoma increased , thought venin blockage  Placed on the refresh eye drop  Left not seeing real weel      Today's PHQ-2 Score:   PHQ-2 ( 1999 Pf[employer]) 9/25/2019   Q1: Little interest or pleasure in doing things 0   Q2: Feeling down, depressed or hopeless 0   PHQ-2 Score 0   Q1: Little interest or pleasure in doing things Not at all   Q2: Feeling down, depressed or hopeless Not at all   PHQ-2 Score 0       Abuse: Current or Past(Physical, Sexual or Emotional)- No  Do you feel safe in your environment? Yes    Social History     Tobacco Use     Smoking status: Never Smoker     Smokeless tobacco: Never Used     Tobacco comment: smoke free household.   Substance Use Topics     Alcohol use: No     If you drink alcohol do you typically have >3 drinks per day or >7 drinks per week? No    Alcohol Use 9/25/2019   Prescreen: >3 drinks/day or >7 drinks/week? No   Prescreen: >3 drinks/day or >7 drinks/week? -   No flowsheet data found.    Last PSA:   PSA   Date Value Ref Range Status   04/27/2009 0.82 0 - 4 ug/L Final       Reviewed orders with patient. Reviewed health maintenance and updated orders accordingly - Yes  Lab work is in process  Labs reviewed in EPIC  BP Readings from Last 3 Encounters:   09/25/19 (!) 158/90   09/10/18 126/68   09/06/17 132/82    Wt Readings from Last 3 Encounters:   09/25/19 84.4 kg (186 lb)   09/10/18 82.6 kg (182  lb)   09/06/17 83.5 kg (184 lb)                  Patient Active Problem List   Diagnosis     CARDIOVASCULAR SCREENING; LDL GOAL LESS THAN 160     Preglaucoma     Vitamin D deficiency disease     Cup to disc asymmetry, bilateral     Branch retinal vein occlusion of left eye with macular edema- sees VRS Dr Marisa Chavez     Past Surgical History:   Procedure Laterality Date     AVASTIN (BEVACIZUMAB) 1.25 MG INTRAVITREAL INJECTION OS (LEFT EYE) Left     4-15-19 Dr Marisa Chavez, [facility]       Social History     Tobacco Use     Smoking status: Never Smoker     Smokeless tobacco: Never Used     Tobacco comment: smoke free household.   Substance Use Topics     Alcohol use: No     Family History   Problem Relation Age of Onset     Hypertension Father      Family History Negative Other      Cancer No family hx of      Diabetes No family hx of      Cerebrovascular Disease No family hx of      Thyroid Disease No family hx of      Glaucoma No family hx of      Macular Degeneration No family hx of          Current Outpatient Medications   Medication Sig Dispense Refill     metoprolol succinate ER (TOPROL-XL) 25 MG 24 hr tablet Take 1 tablet (25 mg) by mouth daily 90 tablet 3     Ophthalmic Irrigation Solution (COLLYRIUM FOR FRESH EYES OP)        acetaminophen (TYLENOL) 500 MG tablet Take 2 tablets (1,000 mg) by mouth every 6 hours as needed for mild pain (Patient not taking: Reported on 9/10/2018) 100 tablet 0     No Known Allergies    Reviewed and updated as needed this visit by clinical staff  Tobacco  Allergies  Meds  Med Hx  Surg Hx  Fam Hx  Soc Hx        Reviewed and updated as needed this visit by Provider            Review of Systems   Constitutional: Negative for chills and fever.   HENT: Negative for congestion and ear pain.    Eyes: Negative for pain.   Respiratory: Negative for cough.    Cardiovascular: Negative for chest pain.   Gastrointestinal: Negative for abdominal pain, constipation, diarrhea and hematochezia.    Genitourinary: Negative for frequency and hematuria.   Neurological: Negative for dizziness.   Psychiatric/Behavioral: The patient is not nervous/anxious.      CONSTITUTIONAL: NEGATIVE for fever, chills, change in weight  INTEGUMENTARY/SKIN: NEGATIVE for worrisome rashes, moles or lesions  EYES: NEGATIVE for vision changes or irritation  ENT: NEGATIVE for ear, mouth and throat problems  RESP: NEGATIVE for significant cough or SOB  CV: NEGATIVE for chest pain, palpitations or peripheral edema  GI: NEGATIVE for nausea, abdominal pain, heartburn, or change in bowel habits   male: negative for dysuria, hematuria, decreased urinary stream, erectile dysfunction, urethral discharge  MUSCULOSKELETAL: NEGATIVE for significant arthralgias or myalgia  NEURO: NEGATIVE for weakness, dizziness or paresthesias  PSYCHIATRIC: NEGATIVE for changes in mood or affect    OBJECTIVE:   BP (!) 158/90   Pulse 63   Wt 84.4 kg (186 lb)   SpO2 96%   BMI 30.25 kg/m      Physical Exam  GENERAL: healthy, alert and no distress  EYES: Eyes grossly normal to inspection, PERRL and conjunctivae and sclerae normal  HENT: ear canals and TM's normal, nose and mouth without ulcers or lesions  NECK: no adenopathy, no asymmetry, masses, or scars and thyroid normal to palpation  RESP: lungs clear to auscultation - no rales, rhonchi or wheezes  CV: regular rate and rhythm, normal S1 S2, no S3 or S4, no murmur, click or rub, no peripheral edema and peripheral pulses strong  ABDOMEN: soft, nontender, no hepatosplenomegaly, no masses and bowel sounds normal  MS: no gross musculoskeletal defects noted, no edema  SKIN: no suspicious lesions or rashes  NEURO: Normal strength and tone, mentation intact and speech normal  BACK: no CVA tenderness, no paralumbar tenderness  PSYCH: mentation appears normal, affect normal/bright  LYMPH: no cervical, supraclavicular, axillary, or inguinal adenopathy    Diagnostic Test Results:  Labs reviewed in Epic  Results for  "orders placed or performed in visit on 09/10/18   HIV Antigen Antibody Combo   Result Value Ref Range    HIV Antigen Antibody Combo Nonreactive NR^Nonreactive       Basic metabolic panel   Result Value Ref Range    Sodium 138 133 - 144 mmol/L    Potassium 4.2 3.4 - 5.3 mmol/L    Chloride 104 94 - 109 mmol/L    Carbon Dioxide 25 20 - 32 mmol/L    Anion Gap 9 3 - 14 mmol/L    Glucose 77 70 - 99 mg/dL    Urea Nitrogen 11 7 - 30 mg/dL    Creatinine 1.21 0.66 - 1.25 mg/dL    GFR Estimate 64 >60 mL/min/1.7m2    GFR Estimate If Black 77 >60 mL/min/1.7m2    Calcium 9.0 8.5 - 10.1 mg/dL   *UA reflex to Microscopic and Culture ([facility] (except [place] and [place])   Result Value Ref Range    Color Urine Yellow     Appearance Urine Clear     Glucose Urine Negative NEG^Negative mg/dL    Bilirubin Urine Negative NEG^Negative    Ketones Urine Negative NEG^Negative mg/dL    Specific Gravity Urine 1.010 1.003 - 1.035    Blood Urine Negative NEG^Negative    pH Urine 7.0 5.0 - 7.0 pH    Protein Albumin Urine Negative NEG^Negative mg/dL    Urobilinogen Urine 1.0 0.2 - 1.0 EU/dL    Nitrite Urine Negative NEG^Negative    Leukocyte Esterase Urine Negative NEG^Negative    Source Midstream Urine        ASSESSMENT/PLAN:       ICD-10-CM    1. HTN, goal below 140/90 I10 Basic metabolic panel     Lipid panel reflex to direct LDL Fasting     TSH with free T4 reflex     metoprolol succinate ER (TOPROL-XL) 25 MG 24 hr tablet   2. 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    Weight as of this encounter: 84.4 kg (186 lb).     Weight management plan: Discussed healthy diet and exercise guidelines     reports that he has " never smoked. He has never used smokeless tobacco.    ICD-10-CM    1. HTN, goal below 140/90 I10 Basic metabolic panel     Lipid panel reflex to direct LDL Fasting     TSH with free T4 reflex     metoprolol succinate ER (TOPROL-XL) 25 MG 24 hr tablet   2. 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

## 2019-09-26 LAB
ANION GAP SERPL CALCULATED.3IONS-SCNC: 8 MMOL/L (ref 3–14)
BUN SERPL-MCNC: 18 MG/DL (ref 7–30)
CALCIUM SERPL-MCNC: 8.4 MG/DL (ref 8.5–10.1)
CHLORIDE SERPL-SCNC: 106 MMOL/L (ref 94–109)
CHOLEST SERPL-MCNC: 229 MG/DL
CO2 SERPL-SCNC: 25 MMOL/L (ref 20–32)
CREAT SERPL-MCNC: 1.2 MG/DL (ref 0.66–1.25)
GFR SERPL CREATININE-BSD FRML MDRD: 71 ML/MIN/{1.73_M2}
GLUCOSE SERPL-MCNC: 73 MG/DL (ref 70–99)
HDLC SERPL-MCNC: 55 MG/DL
LDLC SERPL CALC-MCNC: 146 MG/DL
NONHDLC SERPL-MCNC: 174 MG/DL
POTASSIUM SERPL-SCNC: 4.1 MMOL/L (ref 3.4–5.3)
SODIUM SERPL-SCNC: 139 MMOL/L (ref 133–144)
TRIGL SERPL-MCNC: 139 MG/DL
TSH SERPL DL<=0.005 MIU/L-ACNC: 2.33 MU/L (ref 0.4–4)

## 2019-11-04 ENCOUNTER — TELEPHONE (OUTPATIENT)
Dept: FAMILY MEDICINE | Facility: CLINIC | Age: 49
End: 2019-11-04

## 2019-11-04 NOTE — TELEPHONE ENCOUNTER
Attempt #   Called patient at home number.  Was call answered?  Yes, when takes medication at 6:30 every night, about one hour back gets warm and feet feel very hot also    Takes BP at home and has been 155/9? Last time 144/67     Feels tired after taking the pill - when wakes up in am feels normal.   DENIES: SOB, elimination issues, other symptoms.    Scheduled appointment Friday noon 11/8      Debbie Guaman RN  [facility]

## 2019-11-04 NOTE — TELEPHONE ENCOUNTER
Pt called and stated he is having a reaction from the medication metoprolol succinate ER (TOPROL-XL) 25 MG 24 hr tablet.  He stated he is very tired after taking this medication.  Also that his back and toes feel heavy.  Please call pt back at [phone] to discuss further    [facility]  Team 3 Coordinator

## 2019-11-04 NOTE — TELEPHONE ENCOUNTER
Attempt #1 to call patient.     Patient did not answer, RN left voicemail at mobile number. RN requested patient return call to Nurse Triage line at [phone].     Mikki Harper RN, BSN, PHN  [facility]: [place]

## 2019-11-07 NOTE — TELEPHONE ENCOUNTER
Patient returned call to triage line.     RN called patient and relayed provider's message. Patient verbalized understanding.     Mikki Harper, RN, BSN, PHN  [facility]: [place]

## 2019-11-07 NOTE — TELEPHONE ENCOUNTER
Called patient at home and mobile. Left message to return call to nurse triage line at [phone].    Grace Levy RN

## 2019-12-26 ENCOUNTER — OFFICE VISIT (OUTPATIENT)
Dept: FAMILY MEDICINE | Facility: CLINIC | Age: 49
End: 2019-12-26
Payer: COMMERCIAL

## 2019-12-26 VITALS
SYSTOLIC BLOOD PRESSURE: 148 MMHG | OXYGEN SATURATION: 100 % | DIASTOLIC BLOOD PRESSURE: 100 MMHG | WEIGHT: 186 LBS | HEART RATE: 60 BPM | BODY MASS INDEX: 30.25 KG/M2

## 2019-12-26 DIAGNOSIS — I10 HTN, GOAL BELOW 140/90: ICD-10-CM

## 2019-12-26 DIAGNOSIS — H40.002 GLAUCOMA SUSPECT, LEFT: Primary | ICD-10-CM

## 2019-12-26 PROCEDURE — 99213 OFFICE O/P EST LOW 20 MIN: CPT | Performed by: INTERNAL MEDICINE

## 2019-12-26 RX ORDER — METOPROLOL SUCCINATE 25 MG/1
25 TABLET, EXTENDED RELEASE ORAL DAILY
Qty: 90 TABLET | Refills: 3 | Status: SHIPPED | OUTPATIENT
Start: 2019-12-26 | End: 2020-06-04

## 2019-12-26 NOTE — PROGRESS NOTES
Subjective     Clement Consuelo Monte is a 49 year old male who presents to clinic today for the following health issues:    HPI   Medication refills/review  139-150/   Went to the 174/ 105  25 mg each day.    Feels normal each day  Pulse does not go down  Stopped salt  Dr. Carpio - vein disconnected ( placed surgiery 6-7 times        Patient Active Problem List   Diagnosis     CARDIOVASCULAR SCREENING; LDL GOAL LESS THAN 160     Preglaucoma     Vitamin D deficiency disease     Cup to disc asymmetry, bilateral     Branch retinal vein occlusion of left eye with macular edema- sees VRS Dr Marisa Chavez     Past Surgical History:   Procedure Laterality Date     AVASTIN (BEVACIZUMAB) 1.25 MG INTRAVITREAL INJECTION OS (LEFT EYE) Left     4-15-19 Dr Marisa Chavez, [facility]       Social History     Tobacco Use     Smoking status: Never Smoker     Smokeless tobacco: Never Used     Tobacco comment: smoke free household.   Substance Use Topics     Alcohol use: No     Family History   Problem Relation Age of Onset     Hypertension Father      Family History Negative Other      Cancer No family hx of      Diabetes No family hx of      Cerebrovascular Disease No family hx of      Thyroid Disease No family hx of      Glaucoma No family hx of      Macular Degeneration No family hx of          Current Outpatient Medications   Medication Sig Dispense Refill     metoprolol succinate ER (TOPROL-XL) 25 MG 24 hr tablet Take 1 tablet (25 mg) by mouth daily 90 tablet 3     Ophthalmic Irrigation Solution (COLLYRIUM FOR FRESH EYES OP)        No Known Allergies  Recent Labs   Lab Test 09/25/19  1654 09/10/18  1613 09/02/16  1231 04/15/15  0957  09/28/12  1503   *  --  112* 112   < >  --    HDL 55  --  44 50   < >  --    TRIG 139  --  104 50   < >  --    CR 1.20 1.21 1.27*  --    < > 0.98   GFRESTIMATED 71 64 61  --    < > 84   GFRESTBLACK 82 77 74  --    < > >90   POTASSIUM 4.1 4.2 4.3  --    < > 4.0   TSH 2.33  --   --   --   --  2.47     < > = values in this interval not displayed.      BP Readings from Last 3 Encounters:   12/26/19 (!) 148/100   09/25/19 (!) 158/90   09/10/18 126/68    Wt Readings from Last 3 Encounters:   12/26/19 84.4 kg (186 lb)   09/25/19 84.4 kg (186 lb)   09/10/18 82.6 kg (182 lb)                      Reviewed and updated as needed this visit by Provider         Review of Systems   ROS COMP: Constitutional, HEENT, cardiovascular, pulmonary, gi and gu systems are negative, except as otherwise noted.      Objective    BP (!) 148/100   Pulse 60   Wt 84.4 kg (186 lb)   SpO2 100%   BMI 30.25 kg/m    Body mass index is 30.25 kg/m .  Physical Exam   GENERAL: healthy, alert and no distress  EYES: Eyes grossly normal to inspection, PERRL and conjunctivae and sclerae normal  HENT: ear canals and TM's normal, nose and mouth without ulcers or lesions  NECK: no adenopathy, no asymmetry, masses, or scars and thyroid normal to palpation  RESP: lungs clear to auscultation - no rales, rhonchi or wheezes  CV: regular rate and rhythm, normal S1 S2, no S3 or S4, no murmur, click or rub, no peripheral edema and peripheral pulses strong  ABDOMEN: soft, nontender, no hepatosplenomegaly, no masses and bowel sounds normal  MS: no gross musculoskeletal defects noted, no edema  SKIN: no suspicious lesions or rashes  NEURO: Normal strength and tone, mentation intact and speech normal  BACK: no CVA tenderness, no paralumbar tenderness  PSYCH: mentation appears normal, affect normal/bright  LYMPH: no cervical, supraclavicular, axillary, or inguinal adenopathy    Diagnostic Test Results:  Labs reviewed in Epic  No results found for any visits on 12/26/19.        Assessment & Plan       ICD-10-CM    1. Glaucoma suspect, left H40.002    2. HTN, goal below 140/90 I10 metoprolol succinate ER (TOPROL-XL) 25 MG 24 hr tablet      patient with rebound this morning being off the medication at this time  Continue diet and exercise  Will also benefit from a  statin medication  But patient wants to work on regulation      Regular exercise    Return in about 6 weeks (around 2/6/2020).    Nathan Carson MD  [facility]

## 2020-01-15 ENCOUNTER — OFFICE VISIT (OUTPATIENT)
Dept: OPHTHALMOLOGY | Facility: CLINIC | Age: 50
End: 2020-01-15
Payer: COMMERCIAL

## 2020-01-15 DIAGNOSIS — H40.053 BORDERLINE GLAUCOMA OF BOTH EYES WITH OCULAR HYPERTENSION: Primary | ICD-10-CM

## 2020-01-15 DIAGNOSIS — H34.8320 BRANCH RETINAL VEIN OCCLUSION OF LEFT EYE WITH MACULAR EDEMA (H): ICD-10-CM

## 2020-01-15 PROCEDURE — 92012 INTRM OPH EXAM EST PATIENT: CPT | Performed by: STUDENT IN AN ORGANIZED HEALTH CARE EDUCATION/TRAINING PROGRAM

## 2020-01-15 RX ORDER — LATANOPROST 50 UG/ML
1 SOLUTION/ DROPS OPHTHALMIC AT BEDTIME
Qty: 1 BOTTLE | Refills: 12 | Status: SHIPPED | OUTPATIENT
Start: 2020-01-15 | End: 2020-03-30

## 2020-01-15 ASSESSMENT — TONOMETRY
OS_IOP_MMHG: 32
OD_IOP_MMHG: 34
OS_IOP_MMHG: 32
OD_IOP_MMHG: 35
IOP_METHOD: APPLANATION
IOP_METHOD: APPLANATION

## 2020-01-15 ASSESSMENT — VISUAL ACUITY
OD_SC: 20/30
OS_SC+: +2
OS_SC: 20/40
OS_PH_SC: 20/20
OD_PH_SC: 20/20
OD_SC+: +2
METHOD: SNELLEN - LINEAR

## 2020-01-15 ASSESSMENT — EXTERNAL EXAM - LEFT EYE: OS_EXAM: NORMAL

## 2020-01-15 ASSESSMENT — EXTERNAL EXAM - RIGHT EYE: OD_EXAM: NORMAL

## 2020-01-15 ASSESSMENT — CUP TO DISC RATIO
OD_RATIO: 0.6 UD
OS_RATIO: 0.5 UD

## 2020-01-15 ASSESSMENT — SLIT LAMP EXAM - LIDS
COMMENTS: 2+ SCLERAL SHOW INFERIOR
COMMENTS: 2+ SCLERAL SHOW INFERIOR

## 2020-01-15 NOTE — PROGRESS NOTES
Current Eye Medications:  Refresh prn both eyes     Subjective:  Referral from Dr. Marisa Chavez for IOP check. Had a pressure of 31 and 32 at the office last time. High blood pressure has just started to be treated. Has been on meds for about three months now and seeing Dr. Carson in Feb. Trying to eliminate salt and meat from his diet. No headaches or pain in the eyes     Objective:  See Ophthalmology Exam.       Assessment:  Cornel Monte is a 49 year old male who presents with:   Encounter Diagnoses   Name Primary?     Borderline glaucoma of both eyes with ocular hypertension Intraocular pressure 31/32 in Aug 2019, 35/25 today. Discs appear stable. Start latanoprost. Discussed possible need for additional drops.     Branch retinal vein occlusion of left eye with macular edema- sees VRS Dr Marisa Chavez        Plan:  Start Latanoprost (teal top) at bedtime in both eyes     Return for eye pressure check in 3 weeks.     Bhavesh Kim MD  [phone]

## 2020-01-15 NOTE — LETTER
1/15/2020         RE: Cornel Monte  [address] [facility] [address]        Dear Colleague,    Thank you for referring your patient, Cornel Monte, to the [facility]. Please see a copy of my visit note below.     Current Eye Medications:  Refresh prn both eyes     Subjective:  Referral from Dr. Marisa Chavez for IOP check. Had a pressure of 31 and 32 at the office last time. High blood pressure has just started to be treated. Has been on meds for about three months now and seeing Dr. Carson in Feb. Trying to eliminate salt and meat from his diet. No headaches or pain in the eyes     Objective:  See Ophthalmology Exam.       Assessment:  Cornel Monte is a 49 year old male who presents with:   Encounter Diagnoses   Name Primary?     Borderline glaucoma of both eyes with ocular hypertension Intraocular pressure 31/32 in Aug 2019, 35/25 today. Discs appear stable. Start latanoprost. Discussed possible need for additional drops.     Branch retinal vein occlusion of left eye with macular edema- sees VRS Dr Marisa Chavez        Plan:  Start Latanoprost (teal top) at bedtime in both eyes     Return for eye pressure check in 3 weeks.     Bhavesh Kim MD  [phone]        Again, thank you for allowing me to participate in the care of your patient.        Sincerely,        Bhavesh Kim MD

## 2020-01-15 NOTE — PATIENT INSTRUCTIONS
Start Latanoprost (teal top) at bedtime in both eyes     Return for eye pressure check in 3 weeks.     Bhavesh Kim MD  [phone]

## 2020-02-04 ENCOUNTER — TELEPHONE (OUTPATIENT)
Dept: FAMILY MEDICINE | Facility: CLINIC | Age: 50
End: 2020-02-04

## 2020-02-04 NOTE — TELEPHONE ENCOUNTER
Reason for Call:  Other - Not Feeling Well    Detailed comments: Patient called to talk to a nurse.  He has a headache, eyes hurt, hands and feet are hot but no fever.    Please call to advise.     Phone Number Patient can be reached at: Home number on file [phone] (home)    Best Time: anytime    Can we leave a detailed message on this number? YES         Call taken on 2/4/2020 at 7:07 AM by Carmelita Reyna

## 2020-02-04 NOTE — TELEPHONE ENCOUNTER
Attempt # 1Called patient at home [phone] (home)  Was call answered?  Yes, started Sunday with headache, eyes hurt, feet and hands feel very hot, temp 97 temporal. Everything touches feels very cold. Did eat and drink yesterday mostly tea. No nasal congestion, a little bit of cough - nonproductive, no vomiting or diarrhea, states just feels tired, did have flu shot this year. Not around anyone else that was ill. No trouble breathing. Headaches yesterday rates 8/10, today is a better about 5/10.   Has not taken anything is wondering if can take ibuprofen/tylenol - nurse advised may alternate between the two following directions on the bottles, do not take together.  Advised: if dyspnea/SOB, blue fingers/nails/tongue go to ER. Any other new symptoms call clinic. Patient verbalized understanding and agreement with plan and had no further questions.               Debbie Guaamn RN  [facility]

## 2020-02-04 NOTE — TELEPHONE ENCOUNTER
This is likely early signs of infection  Ok to alternative tylenol 1000 mg every 6 hours and ibuprofen 600 mg every 6 hours    If more severe symptoms lasting > 10 days, come in for evaluation

## 2020-02-04 NOTE — TELEPHONE ENCOUNTER
Attempt # 1  Called patient at home [phone]   Was call answered?  No answer, left message to call nurse line at [phone]          Debbie Guaman RN  [facility]

## 2020-02-05 NOTE — TELEPHONE ENCOUNTER
Attempt # 2  Called patient at home [phone]   Was call answered?  No answer, left message to call nurse line at [phone]    Debbie Guaman RN  [facility]

## 2020-02-06 NOTE — TELEPHONE ENCOUNTER
Attempt # 3  Called patient at home [phone] (home)     Was call answered?  Yes, wife answered states patient is at work nurse should call him there. Tried Cell [phone] patient answered - nurse relayed message from provider to patient, Patient verbalized understanding and agreement with plan and states feels better and is back to work today.        Debbie Guaman RN  [facility]

## 2020-02-07 ENCOUNTER — OFFICE VISIT (OUTPATIENT)
Dept: OPHTHALMOLOGY | Facility: CLINIC | Age: 50
End: 2020-02-07
Payer: COMMERCIAL

## 2020-02-07 DIAGNOSIS — H40.003 GLAUCOMA SUSPECT, BILATERAL: Primary | ICD-10-CM

## 2020-02-07 DIAGNOSIS — H34.8320 BRANCH RETINAL VEIN OCCLUSION OF LEFT EYE WITH MACULAR EDEMA (H): ICD-10-CM

## 2020-02-07 PROCEDURE — 92012 INTRM OPH EXAM EST PATIENT: CPT | Performed by: STUDENT IN AN ORGANIZED HEALTH CARE EDUCATION/TRAINING PROGRAM

## 2020-02-07 ASSESSMENT — SLIT LAMP EXAM - LIDS
COMMENTS: 2+ SCLERAL SHOW INFERIOR
COMMENTS: 2+ SCLERAL SHOW INFERIOR

## 2020-02-07 ASSESSMENT — TONOMETRY
IOP_METHOD: APPLANATION
OS_IOP_MMHG: 20
OD_IOP_MMHG: 21

## 2020-02-07 ASSESSMENT — VISUAL ACUITY
OS_SC+: -1
OS_SC: 20/30
OD_SC: 20/25
OD_SC+: +1
METHOD: SNELLEN - LINEAR

## 2020-02-07 ASSESSMENT — CUP TO DISC RATIO
OD_RATIO: 0.6 UD
OS_RATIO: 0.5 UD

## 2020-02-07 ASSESSMENT — EXTERNAL EXAM - LEFT EYE: OS_EXAM: NORMAL

## 2020-02-07 ASSESSMENT — EXTERNAL EXAM - RIGHT EYE: OD_EXAM: NORMAL

## 2020-02-07 NOTE — PROGRESS NOTES
Current Eye Medications:  Latanoprost (teal top) at bedtime in both eyes       Subjective:  Here for IOP check. Doing well, not missing drops. Vision is doing well, unchanged.     Objective:  See Ophthalmology Exam.       Assessment:  Cornel Monte is a 49 year old male who presents with:   Encounter Diagnoses   Name Primary?     Glaucoma suspect, bilateral Nice response to latanoprost (intraocular pressure 21/20 today, down from 35/32 at last visit).      Branch retinal vein occlusion of left eye with macular edema- sees VRS Dr Marisa Chavez        Plan:  Continue Latanoprost (teal top) at bedtime both eyes     Return in 4 months for glaucoma testing    Bhavesh Kim MD  [phone]

## 2020-02-07 NOTE — LETTER
2/7/2020       RE: Cornel Monte  [address] [facility] [place] [address]      Dear Dr. Chavez,    I saw our mutual patient, Cornel Monte, on February 7th, 2020 at the [facility]. Please see a copy of my visit note below.     Current Eye Medications:  Latanoprost (teal top) at bedtime in both eyes       Subjective:  Here for IOP check. Doing well, not missing drops. Vision is doing well, unchanged.     Objective:  See Ophthalmology Exam.       Assessment:  Cornel Monte is a 49 year old male who presents with:   Encounter Diagnoses   Name Primary?     Glaucoma suspect, bilateral Nice response to latanoprost (intraocular pressure 21/20 today, down from 35/32 at last visit).      Branch retinal vein occlusion of left eye with macular edema- sees VRS Dr Marisa Chavez        Plan:  Continue Latanoprost (teal top) at bedtime both eyes     Return in 4 months for glaucoma testing    Bhavesh Kim MD  [phone]        Again, thank you for allowing me to participate in the care of your patient.        Sincerely,        Bhavesh Kim MD

## 2020-02-07 NOTE — PATIENT INSTRUCTIONS
Continue Latanoprost (teal top) at bedtime both eyes     Return in 4 months for glaucoma testing    Bhavesh Kim MD  [phone]

## 2020-02-12 ENCOUNTER — ANCILLARY PROCEDURE (OUTPATIENT)
Dept: GENERAL RADIOLOGY | Facility: CLINIC | Age: 50
End: 2020-02-12
Attending: INTERNAL MEDICINE
Payer: COMMERCIAL

## 2020-02-12 ENCOUNTER — OFFICE VISIT (OUTPATIENT)
Dept: FAMILY MEDICINE | Facility: CLINIC | Age: 50
End: 2020-02-12
Payer: COMMERCIAL

## 2020-02-12 VITALS
OXYGEN SATURATION: 100 % | TEMPERATURE: 100.8 F | WEIGHT: 180.5 LBS | HEART RATE: 106 BPM | SYSTOLIC BLOOD PRESSURE: 151 MMHG | BODY MASS INDEX: 29.36 KG/M2 | DIASTOLIC BLOOD PRESSURE: 99 MMHG

## 2020-02-12 DIAGNOSIS — R05.9 COUGH: ICD-10-CM

## 2020-02-12 DIAGNOSIS — J18.9 PNEUMONIA OF LEFT LOWER LOBE DUE TO INFECTIOUS ORGANISM: ICD-10-CM

## 2020-02-12 DIAGNOSIS — R05.9 COUGH: Primary | ICD-10-CM

## 2020-02-12 PROCEDURE — 99214 OFFICE O/P EST MOD 30 MIN: CPT | Performed by: INTERNAL MEDICINE

## 2020-02-12 PROCEDURE — 71046 X-RAY EXAM CHEST 2 VIEWS: CPT

## 2020-02-12 RX ORDER — CEFDINIR 300 MG/1
300 CAPSULE ORAL 2 TIMES DAILY
Qty: 20 CAPSULE | Refills: 0 | Status: SHIPPED | OUTPATIENT
Start: 2020-02-12 | End: 2020-04-01

## 2020-02-12 NOTE — LETTER
[address] [address]  [phone]      Miguel Agraeme Consuelo Monte  1970    Clegraeme is ill and can return to work without restrictions on 2/17/2020.           SUZANNE KEARNEY MD     February 12, 2020

## 2020-02-12 NOTE — PROGRESS NOTES
Ritika Monte is a 49 year old male who presents to clinic today for the following health issues:    HPI   ENT Symptoms             Symptoms: cc Present Absent Comment   Fever/Chills  x     Fatigue  x     Muscle Aches  x     Eye Irritation  x     Sneezing  x     Nasal Carlos Alberto/Drg  x     Sinus Pressure/Pain  x     Loss of smell  x     Dental pain   x    Sore Throat  x     Swollen Glands  x     Ear Pain/Fullness   x    Cough  x     Wheeze  x     Chest Pain  x     Shortness of breath  x  especially when laying down   Rash   x    Other   x      Symptom duration:  10 days    Symptom severity:  moderate   Treatments tried:  Tylenol, Ibuprofen    Contacts:  None           Patient Active Problem List   Diagnosis     CARDIOVASCULAR SCREENING; LDL GOAL LESS THAN 160     Preglaucoma     Vitamin D deficiency disease     Cup to disc asymmetry, bilateral     Branch retinal vein occlusion of left eye with macular edema- sees [facility] Dr Marisa Chavez     Past Surgical History:   Procedure Laterality Date     AVASTIN (BEVACIZUMAB) 1.25 MG INTRAVITREAL INJECTION OS (LEFT EYE) Left     4-15-19 Dr Marisa Chavez, [facility]       Social History     Tobacco Use     Smoking status: Never Smoker     Smokeless tobacco: Never Used     Tobacco comment: smoke free household.   Substance Use Topics     Alcohol use: No     Family History   Problem Relation Age of Onset     Hypertension Father      Family History Negative Other      Cancer No family hx of      Diabetes No family hx of      Cerebrovascular Disease No family hx of      Thyroid Disease No family hx of      Glaucoma No family hx of      Macular Degeneration No family hx of          Current Outpatient Medications   Medication Sig Dispense Refill     cefdinir (OMNICEF) 300 MG capsule Take 1 capsule (300 mg) by mouth 2 times daily for 10 days 20 capsule 0     latanoprost (XALATAN) 0.005 % ophthalmic solution Place 1 drop into both eyes At Bedtime 1 Bottle 12     metoprolol  succinate ER (TOPROL-XL) 25 MG 24 hr tablet Take 1 tablet (25 mg) by mouth daily 90 tablet 3     No Known Allergies  Recent Labs   Lab Test 09/25/19  1654 09/10/18  1613 09/02/16  1231 04/15/15  0957  09/28/12  1503   *  --  112* 112   < >  --    HDL 55  --  44 50   < >  --    TRIG 139  --  104 50   < >  --    CR 1.20 1.21 1.27*  --    < > 0.98   GFRESTIMATED 71 64 61  --    < > 84   GFRESTBLACK 82 77 74  --    < > >90   POTASSIUM 4.1 4.2 4.3  --    < > 4.0   TSH 2.33  --   --   --   --  2.47    < > = values in this interval not displayed.      BP Readings from Last 3 Encounters:   02/12/20 (!) 151/99   12/26/19 (!) 148/100   09/25/19 (!) 158/90    Wt Readings from Last 3 Encounters:   02/12/20 81.9 kg (180 lb 8 oz)   12/26/19 84.4 kg (186 lb)   09/25/19 84.4 kg (186 lb)                      Reviewed and updated as needed this visit by Provider         Review of Systems   ROS COMP: Constitutional, HEENT, cardiovascular, pulmonary, gi and gu systems are negative, except as otherwise noted.      Objective    BP (!) 151/99 (BP Location: Right arm, Patient Position: Chair, Cuff Size: Adult Large)   Pulse 106   Temp 100.8  F (38.2  C) (Oral)   Wt 81.9 kg (180 lb 8 oz)   SpO2 100%   BMI 29.36 kg/m    Body mass index is 29.36 kg/m .  Physical Exam   GENERAL: healthy, alert and no distress  EYES: Eyes grossly normal to inspection, PERRL and conjunctivae and sclerae normal  HENT: ear canals and TM's normal, nose and mouth without ulcers or lesions  NECK: no adenopathy, no asymmetry, masses, or scars and thyroid normal to palpation  RESP: lungs clear to auscultation - no rales, rhonchi or wheezes  CV: regular rate and rhythm, normal S1 S2, no S3 or S4, no murmur, click or rub, no peripheral edema and peripheral pulses strong  ABDOMEN: soft, nontender, no hepatosplenomegaly, no masses and bowel sounds normal  MS: no gross musculoskeletal defects noted, no edema  SKIN: no suspicious lesions or rashes  NEURO: Normal  strength and tone, mentation intact and speech normal  BACK: no CVA tenderness, no paralumbar tenderness  PSYCH: mentation appears normal, affect normal/bright  LYMPH: no cervical, supraclavicular, axillary, or inguinal adenopathy    Diagnostic Test Results:  Labs reviewed in Epic  Results for orders placed or performed in visit on 02/12/20   XR Chest 2 Views     Status: None    Narrative    XR CHEST TWO VIEWS   2/12/2020 6:12 PM     HISTORY: Cough.    COMPARISON: 5/17/2013.      Impression    IMPRESSION: No acute cardiopulmonary disease.    NANDINI FULTON MD           Assessment & Plan       ICD-10-CM    1. Cough R05 XR Chest 2 Views   2. Pneumonia of left lower lobe due to infectious organism (H) J18.1 cefdinir (OMNICEF) 300 MG capsule      if pressure still up after healing from this infection, double metoprolol to 50 mg daily with max of 150-200 mg daily    Re-check in 6 weeks      Regular exercise    No follow-ups on file.    Nathan Carson MD  [facility]

## 2020-02-21 ENCOUNTER — TRANSFERRED RECORDS (OUTPATIENT)
Dept: HEALTH INFORMATION MANAGEMENT | Facility: CLINIC | Age: 50
End: 2020-02-21

## 2020-02-24 ENCOUNTER — DOCUMENTATION ONLY (OUTPATIENT)
Dept: OPHTHALMOLOGY | Facility: CLINIC | Age: 50
End: 2020-02-24

## 2020-03-30 ENCOUNTER — TELEPHONE (OUTPATIENT)
Dept: FAMILY MEDICINE | Facility: CLINIC | Age: 50
End: 2020-03-30

## 2020-03-30 ENCOUNTER — NURSE TRIAGE (OUTPATIENT)
Dept: NURSING | Facility: CLINIC | Age: 50
End: 2020-03-30

## 2020-03-30 DIAGNOSIS — I10 HTN, GOAL BELOW 140/90: Primary | ICD-10-CM

## 2020-03-30 DIAGNOSIS — H34.8320 BRANCH RETINAL VEIN OCCLUSION OF LEFT EYE WITH MACULAR EDEMA (H): ICD-10-CM

## 2020-03-30 DIAGNOSIS — H40.053 BORDERLINE GLAUCOMA OF BOTH EYES WITH OCULAR HYPERTENSION: ICD-10-CM

## 2020-03-30 RX ORDER — LATANOPROST 50 UG/ML
1 SOLUTION/ DROPS OPHTHALMIC AT BEDTIME
Qty: 1 BOTTLE | Refills: 12 | Status: SHIPPED | OUTPATIENT
Start: 2020-03-30 | End: 2021-06-21

## 2020-03-30 NOTE — TELEPHONE ENCOUNTER
Reason for Call:  Other call back    Detailed comments: Patient switched insurance and has not taken blood pressure medication since Friday. Patient is wondering if he should just start taking again or wait until after his telephone visit.     Phone Number Patient can be reached at: Cell number on file:    Telephone Information:   Mobile [phone]       Best Time: anytime    Can we leave a detailed message on this number? YES    Call taken on 3/30/2020 at 12:39 PM by Jasbir Moon

## 2020-03-30 NOTE — TELEPHONE ENCOUNTER
Called pt back to get more clarification on message below, pt currently does not have any of his blood pressure medication at home due to change in insurance, insurance will not cover the medication per the patient. Wants to know if it is okay to stay off the medication until Wednesday telephone visit with Dr. Carson where he can talk to him about this?    Please call patient's back at his cell  [phone]    Routing to PCP.  Amalia Tinoco MA

## 2020-03-30 NOTE — TELEPHONE ENCOUNTER
Wants to switch Pharmacies from [facility] to [facility], and would like his eye Xalatan eye drops prescription sent there also.  Switched Pharmacies in [facility].    Stephany Eaton RN  [facility]    Reason for Disposition    Caller has medication question only, adult not sick, and triager answers question    Protocols used: MEDICATION QUESTION CALL-A-AH

## 2020-03-31 ENCOUNTER — TELEPHONE (OUTPATIENT)
Dept: OPHTHALMOLOGY | Facility: CLINIC | Age: 50
End: 2020-03-31

## 2020-03-31 RX ORDER — METOPROLOL TARTRATE 25 MG/1
25 TABLET, FILM COATED ORAL 2 TIMES DAILY
Qty: 180 TABLET | Refills: 3 | Status: SHIPPED | OUTPATIENT
Start: 2020-03-31 | End: 2020-04-01

## 2020-03-31 NOTE — TELEPHONE ENCOUNTER
Patient called wondering if he should be continuing to use Latanoprost.  I asked him to continue using the drops, including the night before his appointment.

## 2020-03-31 NOTE — TELEPHONE ENCOUNTER
I can try to send the short acting metoprolol .  It works the same - but has to be given twice a day     Can also see if his insurance prefers filling at another pharmacy    I sent the short acting to [facility]

## 2020-04-01 ENCOUNTER — VIRTUAL VISIT (OUTPATIENT)
Dept: FAMILY MEDICINE | Facility: CLINIC | Age: 50
End: 2020-04-01
Payer: MEDICAID

## 2020-04-01 VITALS — DIASTOLIC BLOOD PRESSURE: 81 MMHG | SYSTOLIC BLOOD PRESSURE: 124 MMHG | HEART RATE: 63 BPM

## 2020-04-01 DIAGNOSIS — I10 ESSENTIAL HYPERTENSION: Primary | ICD-10-CM

## 2020-04-01 PROCEDURE — 99213 OFFICE O/P EST LOW 20 MIN: CPT | Mod: TEL | Performed by: INTERNAL MEDICINE

## 2020-04-01 NOTE — PROGRESS NOTES
"Subjective     Cornel Monte is a 49 year old male who is being evaluated via a billable telephone visit.      The patient has been notified of following:     \"This telephone visit will be conducted via a call between you and your physician/provider. We have found that certain health care needs can be provided without the need for a physical exam.  This service lets us provide the care you need with a short phone conversation.  If a prescription is necessary we can send it directly to your pharmacy.  If lab work is needed we can place an order for that and you can then stop by our lab to have the test done at a later time.    If during the course of the call the physician/provider feels a telephone visit is not appropriate, you will not be charged for this service.\"     Patient has given verbal consent for Telephone visit?  Yes  Amalia Tinoco MA    Metoprolol on Monday  Insurance from the job was done - with the new insurance  Can I keep going or wait.    Cornel Monte complains of No chief complaint on file.    March 11 118/77  124/81  127/85 63    132/86  75  127/94 74.      ALLERGIES  Patient has no known allergies.    Hypertension Follow-up      Do you check your blood pressure regularly outside of the clinic? Yes     Are you following a low salt diet? Yes    Are your blood pressures ever more than 140 on the top number (systolic) OR more   than 90 on the bottom number (diastolic), for example 140/90? No          Patient Active Problem List   Diagnosis     CARDIOVASCULAR SCREENING; LDL GOAL LESS THAN 160     Preglaucoma     Vitamin D deficiency disease     Cup to disc asymmetry, bilateral     Branch retinal vein occlusion of left eye with macular edema- sees [facility] Dr Marisa Chavez     Past Surgical History:   Procedure Laterality Date     AVASTIN (BEVACIZUMAB) 1.25 MG INTRAVITREAL INJECTION OS (LEFT EYE) Left     4-15-19 Dr Marisa Chavez, [facility]       Social History     Tobacco Use     Smoking " status: Never Smoker     Smokeless tobacco: Never Used     Tobacco comment: smoke free household.   Substance Use Topics     Alcohol use: No     Family History   Problem Relation Age of Onset     Hypertension Father      Family History Negative Other      Cancer No family hx of      Diabetes No family hx of      Cerebrovascular Disease No family hx of      Thyroid Disease No family hx of      Glaucoma No family hx of      Macular Degeneration No family hx of          Current Outpatient Medications   Medication Sig Dispense Refill     latanoprost (XALATAN) 0.005 % ophthalmic solution Place 1 drop into both eyes At Bedtime 1 Bottle 12     metoprolol succinate ER (TOPROL-XL) 25 MG 24 hr tablet Take 1 tablet (25 mg) by mouth daily (Patient not taking: Reported on 4/1/2020) 90 tablet 3     No Known Allergies  Recent Labs   Lab Test 09/25/19  1654 09/10/18  1613 09/02/16  1231 04/15/15  0957  09/28/12  1503   *  --  112* 112   < >  --    HDL 55  --  44 50   < >  --    TRIG 139  --  104 50   < >  --    CR 1.20 1.21 1.27*  --    < > 0.98   GFRESTIMATED 71 64 61  --    < > 84   GFRESTBLACK 82 77 74  --    < > >90   POTASSIUM 4.1 4.2 4.3  --    < > 4.0   TSH 2.33  --   --   --   --  2.47    < > = values in this interval not displayed.      BP Readings from Last 3 Encounters:   04/01/20 124/81   02/12/20 (!) 151/99   12/26/19 (!) 148/100    Wt Readings from Last 3 Encounters:   02/12/20 81.9 kg (180 lb 8 oz)   12/26/19 84.4 kg (186 lb)   09/25/19 84.4 kg (186 lb)                    Reviewed and updated as needed this visit by Provider         Review of Systems   ROS COMP: Constitutional, HEENT, cardiovascular, pulmonary, gi and gu systems are negative, except as otherwise noted.       Objective   Reported vitals:  /81   Pulse 63    healthy, alert and no distress  Psych: Alert and oriented times 3; coherent speech, normal   rate and volume, able to articulate logical thoughts, able   to abstract reason, no  tangential thoughts, no hallucinations   or delusions  His affect is normal     Diagnostic Test Results:  Labs reviewed in Epic  No results found for any visits on 04/01/20.        Assessment/Plan:  There are no diagnoses linked to this encounter.    Return in about 3 months (around 7/1/2020) for Well , medication management.      ICD-10-CM    1. Essential hypertension  I10      Continue metoprolol and measure at home . If diastolic remains above 85 will increase to 50 mg  Continue exercise and low salt diet    Phone call duration:  12 minutes    Nathan Carson MD

## 2020-06-03 ENCOUNTER — TELEPHONE (OUTPATIENT)
Dept: FAMILY MEDICINE | Facility: CLINIC | Age: 50
End: 2020-06-03

## 2020-06-03 DIAGNOSIS — I10 HTN, GOAL BELOW 140/90: ICD-10-CM

## 2020-06-03 NOTE — TELEPHONE ENCOUNTER
Reason for Call:  Other     Detailed comments: FYI Patient wanted provider to know he takes metoprolol once in the morning and once at night.     Phone Number Patient can be reached at: Home number on file [phone] (home)    Best Time:     Can we leave a detailed message on this number? Not Applicable    Call taken on 6/3/2020 at 11:39 AM by Sarah Bustamante

## 2020-06-04 RX ORDER — METOPROLOL TARTRATE 25 MG/1
25 TABLET, FILM COATED ORAL 2 TIMES DAILY
Qty: 180 TABLET | Refills: 3 | Status: SHIPPED | OUTPATIENT
Start: 2020-06-04 | End: 2020-07-20

## 2020-07-02 ENCOUNTER — OFFICE VISIT (OUTPATIENT)
Dept: OPHTHALMOLOGY | Facility: CLINIC | Age: 50
End: 2020-07-02
Payer: COMMERCIAL

## 2020-07-02 DIAGNOSIS — H34.8320 BRANCH RETINAL VEIN OCCLUSION OF LEFT EYE WITH MACULAR EDEMA (H): ICD-10-CM

## 2020-07-02 DIAGNOSIS — H40.003 GLAUCOMA SUSPECT, BILATERAL: Primary | ICD-10-CM

## 2020-07-02 PROCEDURE — 92133 CPTRZD OPH DX IMG PST SGM ON: CPT | Performed by: STUDENT IN AN ORGANIZED HEALTH CARE EDUCATION/TRAINING PROGRAM

## 2020-07-02 PROCEDURE — 92012 INTRM OPH EXAM EST PATIENT: CPT | Performed by: STUDENT IN AN ORGANIZED HEALTH CARE EDUCATION/TRAINING PROGRAM

## 2020-07-02 PROCEDURE — 92083 EXTENDED VISUAL FIELD XM: CPT | Performed by: STUDENT IN AN ORGANIZED HEALTH CARE EDUCATION/TRAINING PROGRAM

## 2020-07-02 ASSESSMENT — SLIT LAMP EXAM - LIDS
COMMENTS: 2+ SCLERAL SHOW INFERIOR
COMMENTS: 2+ SCLERAL SHOW INFERIOR

## 2020-07-02 ASSESSMENT — EXTERNAL EXAM - RIGHT EYE: OD_EXAM: NORMAL

## 2020-07-02 ASSESSMENT — CUP TO DISC RATIO
OD_RATIO: 0.6 UD
OS_RATIO: 0.5 UD

## 2020-07-02 ASSESSMENT — VISUAL ACUITY
OS_SC: 20/25-1
METHOD: SNELLEN - LINEAR

## 2020-07-02 ASSESSMENT — TONOMETRY
OD_IOP_MMHG: 16
OS_IOP_MMHG: 14
IOP_METHOD: APPLANATION

## 2020-07-02 ASSESSMENT — EXTERNAL EXAM - LEFT EYE: OS_EXAM: NORMAL

## 2020-07-02 NOTE — PROGRESS NOTES
Current Eye Medications:  Latanoprost nightly, last drops at  9pm     Subjective:  6mo iop with oct and hvf  Pt reports has noticed it is harder to see to read, pt uses no glasses. Saw Dr. Chavez about 6 weeks ago - has appointment to see her at the end of August.      Objective:  See Ophthalmology Exam.       Assessment:  Cornel Monte is a 49 year old male who presents with:   Encounter Diagnoses   Name Primary?     Glaucoma suspect, bilateral Davidson visual field (HVF) 24-2: within normal limits both eyes     OCT optic nerve: avg retinal nerve fiber layer 94/94, all within normal limits both eyes (improved edema superiorly in the left eye).     Intraocular pressure 16/14 today on latanoprost. Variable pressures and injections q4mo at present. Continue same medications.        Branch retinal vein occlusion of left eye with macular edema- sees [facility] Dr Marisa Chavez Seeing Dr. Chavez 8/28/20.        Plan:  Continue Latanoprost (green top) at bedtime both eyes     Use +1.50 or +2.00 over the counter readers     Continue care with Dr. Chavez at [facility]    Bhavesh Kim MD  [phone]

## 2020-07-02 NOTE — PATIENT INSTRUCTIONS
Continue Latanoprost (green top) at bedtime both eyes     Use +1.50 or +2.00 over the counter readers     Continue care with Dr. Chavez at [facility]    Bhavesh Kim MD  [phone]

## 2020-07-02 NOTE — LETTER
7/2/2020         RE: Cornel Monte  [address] [facility] [address]        Dear Colleague,    Thank you for referring your patient, Cornel Monte, to the [facility]. Please see a copy of my visit note below.     Current Eye Medications:  Latanoprost nightly, last drops at  9pm     Subjective:  6mo iop with oct and hvf  Pt reports has noticed it is harder to see to read, pt uses no glasses. Saw Dr. Chavez about 6 weeks ago - has appointment to see her at the end of August.      Objective:  See Ophthalmology Exam.       Assessment:  Cornel Monte is a 49 year old male who presents with:   Encounter Diagnoses   Name Primary?     Glaucoma suspect, bilateral Davidson visual field (HVF) 24-2: within normal limits both eyes     OCT optic nerve: avg retinal nerve fiber layer 94/94, all within normal limits both eyes (improved edema superiorly in the left eye).     Intraocular pressure 16/14 today on latanoprost. Variable pressures and injections q4mo at present. Continue same medications.        Branch retinal vein occlusion of left eye with macular edema- sees [facility] Dr Marisa Chavez Seeing Dr. Chavez 8/28/20.        Plan:  Continue Latanoprost (green top) at bedtime both eyes     Use +1.50 or +2.00 over the counter readers     Continue care with Dr. Chavez at [facility]    Bhavesh Kim MD  [phone]        Again, thank you for allowing me to participate in the care of your patient.        Sincerely,        Bhavesh Kim MD

## 2020-07-20 ENCOUNTER — OFFICE VISIT (OUTPATIENT)
Dept: FAMILY MEDICINE | Facility: CLINIC | Age: 50
End: 2020-07-20
Payer: COMMERCIAL

## 2020-07-20 VITALS
DIASTOLIC BLOOD PRESSURE: 82 MMHG | TEMPERATURE: 98.1 F | SYSTOLIC BLOOD PRESSURE: 132 MMHG | OXYGEN SATURATION: 100 % | HEART RATE: 60 BPM | BODY MASS INDEX: 29.49 KG/M2 | WEIGHT: 181.3 LBS

## 2020-07-20 DIAGNOSIS — E55.9 VITAMIN D DEFICIENCY: Primary | ICD-10-CM

## 2020-07-20 DIAGNOSIS — I10 HTN, GOAL BELOW 140/90: ICD-10-CM

## 2020-07-20 PROCEDURE — 99213 OFFICE O/P EST LOW 20 MIN: CPT | Performed by: INTERNAL MEDICINE

## 2020-07-20 RX ORDER — MULTIVIT-MIN/IRON/FOLIC ACID/K 18-600-40
1000 CAPSULE ORAL DAILY
Qty: 90 TABLET | Refills: 3 | Status: SHIPPED | OUTPATIENT
Start: 2020-07-20 | End: 2021-02-24

## 2020-07-20 RX ORDER — METOPROLOL TARTRATE 25 MG/1
25 TABLET, FILM COATED ORAL 2 TIMES DAILY
Qty: 180 TABLET | Refills: 3 | Status: SHIPPED | OUTPATIENT
Start: 2020-07-20 | End: 2020-10-16

## 2020-07-20 NOTE — PATIENT INSTRUCTIONS
1. Double check the sodium content of all foods  2. Be consistent with the time of the medications ideally 12 hours apart.    Patient Education     Low-Salt Choices  Eating salt (sodium) can make your body retain too much water. Excess water makes your heart work harder. Canned, packaged, and frozen foods are easy to prepare. But they are often high in sodium. Here are some ideas for low-salt foods you can easily make yourself.    For breakfast    Fruit or 100% fruit juice    Whole-wheat bread or an English muffin. Look for sodium content on Nutrition Facts labels.    Low-fat milk or yogurt    Unsalted eggs    Shredded wheat    Corn tortillas    Unsalted steamed rice    Regular (not instant) hot cereal, made without salt  Stay away from:    Sausage, farrell, and ham    Flour tortillas    Packaged muffins, pancakes, and biscuits    Instant hot cereals    Cottage cheese    For lunch and dinner    Fresh fish, chicken, turkey, or meat--baked, broiled, or roasted without salt    Dry beans, cooked without salt    Tofu, stir-fried without salt    Unsalted fresh fruit and vegetables, or frozen or canned fruit and vegetables with no added salt  Stay away from:    Lunch or deli meat that is cured or smoked    Cheese    Tomato juice and ketchup    Canned vegetables, soups, and fish not labeled as no-salt-added or reduced sodium    Packaged gravies and sauces    Olives, pickles, and relish    Bottled salad dressings    For snacks and desserts    Yogurt    Unsalted, air-popped popcorn    Unsalted nuts or seeds  Stay away from:    Pies and cakes    Packaged dessert mixes    Pizza    Canned and packaged puddings    Pretzels, chips, crackers, and nuts--unless the label says unsalted    Date Last Reviewed: 6/1/2017 2000-2019 [employer]. [address] [place] [address], [place] [address]. All rights reserved. This information is not intended as a substitute for professional medical care. Always follow your healthcare  professional's instructions.           Patient Education     Discharge Instructions: Eating a Low-Salt Diet  Your healthcare provider has prescribed a low-salt diet for you. Most people with heart problems need to eat less salt, which is full of sodium. Too much sodium is linked to high blood pressure, which is linked to a greater risk of heart disease, stroke, blindness, and kidney problems.  Home care    Learn ways to cut back on salt (sodium):    Eat less frozen, canned, dried, packaged, and fast foods. These often contain high amounts of sodium.    Season foods with herbs instead of salt when you cook.    Season with flavorings such as pepper, lemon, garlic, and onion.    Don t add salt to your food at the table.    Sprinkle salt-free herbal blends on meats and vegetables.  Learn to read food labels carefully:    Look for the total amount of sodium per serving.    Look for foods labeled low sodium, reduced sodium, or no added salt.    Beware. Salt goes by many names. Cut down on foods with these words (all forms of salt) listed as ingredients:  ? Salt  ? Sodium  ? Soy sauce  ? Baking soda  ? Baking powder  ? MSG (monosodium glutamate)  ? Monosodium  ? Na (the chemical symbol for sodium)  Other ideas:    Use more fresh food. Buy more fruits and vegetables.    Select lean meats, fish, and poultry.    Find a cookbook with low-salt recipes. You ll find ideas for tasty meals that are healthy for your heart.    When eating out, ask questions about the menu. Tell the  you're on a low-salt diet.  ? If you order fish, chicken, beef, or pork, ask to have it broiled, baked, poached, or grilled without salt, butter, or breading.  ? Choose plain steamed rice, boiled noodles, and baked or boiled potatoes. Top potatoes with chives and a little sour cream instead of butter.    Avoid antacids that are high in salt. Check the label before you buy.  Follow-up  Make a follow-up appointment with your healthcare provider, or as  advised. Your provider may refer you to a dietitian.   Date Last Reviewed: 6/1/2017 2000-2019 [employer]. [address] [place] [address], [place] [address]. All rights reserved. This information is not intended as a substitute for professional medical care. Always follow your healthcare professional's instructions.           Patient Education     Low-Salt Diet  This diet removes foods that are high in salt. It also limits the amount of salt you use when cooking. It is most often used for people with high blood pressure, edema (fluid retention), and kidney, liver, or heart disease.  Table salt contains the mineral sodium. Your body needs sodium to work normally. But too much sodium can make your health problems worse. Your healthcare provider is recommending a low-salt (also called low-sodium) diet for you. Your total daily allowance of salt is 1,500 to 2,300 milligrams (mg). It is less than 1 teaspoon of table salt. This means you can have only about 500 to 700 mg of sodium at each meal. People with certain health problems should limit salt intake to the lower end of the recommended range.    When you cook, don t add much salt. If you can cook without using salt, even better. Don t add salt to your food at the table.  When shopping, read food labels. Salt is often called sodium on the label. Choose foods that are salt-free, low salt, or very low salt. Note that foods with reduced salt may not lower your salt intake enough.    Beans, potatoes, and pasta  Ok: Dry beans, split peas, lentils, potatoes, rice, macaroni, pasta, spaghetti without added salt  Avoid: Potato chips, tortilla chips, and similar products  Breads and cereals  Ok: Low-sodium breads, rolls, cereals, and cakes; low-salt crackers, matzo crackers  Avoid: Salted crackers, pretzels, popcorn, [place] toast, pancakes, muffins  Dairy  Ok: Milk, chocolate milk, hot chocolate mix, low-salt cheeses, and yogurt  Avoid: Processed cheese and cheese  spreads; Roquefort, Camembert, and cottage cheese; buttermilk, instant breakfast drink  Desserts  Ok: Ice cream, frozen yogurt, juice bars, gelatin, cookies and pies, sugar, honey, jelly, hard candy  Avoid: Most pies, cakes and cookies prepared or processed with salt; instant pudding  Drinks  Ok: Tea, coffee, fizzy (carbonated) drinks, juices  Avoid: Flavored coffees, electrolyte replacement drinks, sports drinks  Meats  Ok: All fresh meat, fish, poultry, low-salt tuna, eggs, egg substitute  Avoid: Smoked, pickled, brine-cured, or salted meats and fish. This includes farrell, chipped beef, corned beef, hot dogs, deli meats, ham, kosher meats, salt pork, sausage, canned tuna, salted codfish, smoked salmon, herring, sardines, or anchovies.  Seasonings and spices  Ok: Most seasonings are okay. Good substitutes for salt include: fresh herb blends, hot sauce, lemon, garlic, ahuja, vinegar, dry mustard, parsley, cilantro, horseradish, tomato paste, regular margarine, mayonnaise, unsalted butter, cream cheese, vegetable oil, cream, low-salt salad dressing and gravy.  Avoid: Regular ketchup, relishes, pickles, soy sauce, teriyaki sauce, Worcestershire sauce, BBQ sauce, tartar sauce, meat tenderizer, chili sauce, regular gravy, regular salad dressing, salted butter  Soups  Ok: Low-salt soups and broths made with allowed foods  Avoid: Bouillon cubes, soups with smoked or salted meats, regular soup and broth  Vegetables  Ok: Most vegetables are okay; also low-salt tomato and vegetable juices  Avoid: Sauerkraut and other brine-soaked vegetables; pickles and other pickled vegetables; tomato juice, olives  Date Last Reviewed: 8/1/2016 2000-2019 [employer]. [address] [place] [address], [place] [address]. All rights reserved. This information is not intended as a substitute for professional medical care. Always follow your healthcare professional's instructions.           Patient Education     Tips for Using Less  Salt    Most people with heart problems need to eat less salt (sodium). Reducing the amount of salt you eat may help control your blood pressure. The higher your blood pressure, the greater your risk for heart disease, stroke, blindness, and kidney problems.  At the store    Make low-salt choices by reading labels carefully. Look for the total amount of sodium per serving.    Use more fresh food. Buy more fruits and vegetables. Select lean meats, fish, and poultry.    Use fewer frozen, canned, and packaged foods which often contain a lot of sodium.    Use plain frozen vegetables without sauces or toppings. These products are often low-sodium or no-sodium.    Choose reduced-sodium or no-salt-added versions of canned vegetables and soups.  In the kitchen    Don't add salt to food when you're cooking. Season with flavorings such as onion, garlic, pepper, salt-free herbal blends, and lemon or lime juice.    Use a cookbook containing low-salt recipes. It can give you ideas for tasty meals that are healthy for your heart.    Sprinkle salt-free herbal blends on vegetables and meat.    Drain and rinse canned foods, such as canned beans and vegetables, before cooking or eating.  Eating out    Tell the  you're on a low-salt diet. Ask questions about the menu.    Order fish, chicken, and meat broiled, baked, poached, or grilled without salt, butter, or breading.    Use lemon, pepper, and salt-free herb mixes to add flavor.    Choose plain steamed rice, boiled noodles, and baked or boiled potatoes. Top potatoes with chives and a little sour cream.     Beware! Salt goes by many other names. Limit foods with these words listed as ingredients: salt, sodium, soy sauce, baking soda, baking powder, MSG, monosodium, Na (the chemical symbol for sodium). Some antacids are also high in salt.   Date Last Reviewed: 6/1/2017 2000-2019 [employer]. [address] [place] [address], [place] [address]. All rights reserved. This  information is not intended as a substitute for professional medical care. Always follow your healthcare professional's instructions.

## 2020-07-20 NOTE — PROGRESS NOTES
"Subjective     Clement Consuelo Monte is a 49 year old male who presents to clinic today for the following health issues:    HPI     Hypertension Follow-up       Do you check your blood pressure regularly outside of the clinic? Yes     Are you following a low salt diet? Yes    Are your blood pressures ever more than 140 on the top number (systolic) OR more   than 90 on the bottom number (diastolic), for example 140/90? No    Occasionally having a \"pin\" sharp pain in chest area when moving or bending.      How many servings of fruits and vegetables do you eat daily?  2-3    On average, how many sweetened beverages do you drink each day (Examples: soda, juice, sweet tea, etc.  Do NOT count diet or artificially sweetened beverages)?   0    How many days per week do you exercise enough to make your heart beat faster? 4    How many minutes a day do you exercise enough to make your heart beat faster? 30 - 60    How many days per week do you miss taking your medication? 0      House checking 139/92   140/92-93   140/ 86 is the     Left sided pain in with exercise        Patient Active Problem List   Diagnosis     CARDIOVASCULAR SCREENING; LDL GOAL LESS THAN 160     Preglaucoma     Vitamin D deficiency disease     Cup to disc asymmetry, bilateral     Branch retinal vein occlusion of left eye with macular edema- sees VRS Dr Marisa Chavez     Past Surgical History:   Procedure Laterality Date     AVASTIN (BEVACIZUMAB) 1.25 MG INTRAVITREAL INJECTION OS (LEFT EYE) Left     4-15-19 Dr Marisa Chavez, [facility]       Social History     Tobacco Use     Smoking status: Never Smoker     Smokeless tobacco: Never Used     Tobacco comment: smoke free household.   Substance Use Topics     Alcohol use: No     Family History   Problem Relation Age of Onset     Hypertension Father      Family History Negative Other      Cancer No family hx of      Diabetes No family hx of      Cerebrovascular Disease No family hx of      Thyroid Disease No family " hx of      Glaucoma No family hx of      Macular Degeneration No family hx of          Current Outpatient Medications   Medication Sig Dispense Refill     latanoprost (XALATAN) 0.005 % ophthalmic solution Place 1 drop into both eyes At Bedtime 1 Bottle 12     metoprolol tartrate (LOPRESSOR) 25 MG tablet Take 1 tablet (25 mg) by mouth 2 times daily 180 tablet 3     Vitamin D, Cholecalciferol, 25 MCG (1000 UT) TABS Take 1 tablet (1,000 Units) by mouth daily 90 tablet 3     No Known Allergies  Recent Labs   Lab Test 09/25/19  1654 09/10/18  1613 09/02/16  1231 04/15/15  0957  09/28/12  1503   *  --  112* 112   < >  --    HDL 55  --  44 50   < >  --    TRIG 139  --  104 50   < >  --    CR 1.20 1.21 1.27*  --    < > 0.98   GFRESTIMATED 71 64 61  --    < > 84   GFRESTBLACK 82 77 74  --    < > >90   POTASSIUM 4.1 4.2 4.3  --    < > 4.0   TSH 2.33  --   --   --   --  2.47    < > = values in this interval not displayed.      BP Readings from Last 3 Encounters:   07/20/20 132/82   04/01/20 124/81   02/12/20 (!) 151/99    Wt Readings from Last 3 Encounters:   07/20/20 82.2 kg (181 lb 4.8 oz)   02/12/20 81.9 kg (180 lb 8 oz)   12/26/19 84.4 kg (186 lb)                    Reviewed and updated as needed this visit by Provider         Review of Systems   Constitutional, HEENT, cardiovascular, pulmonary, gi and gu systems are negative, except as otherwise noted.      Objective    There were no vitals taken for this visit.  There is no height or weight on file to calculate BMI.  Physical Exam   GENERAL: healthy, alert and no distress  EYES: Eyes grossly normal to inspection, PERRL and conjunctivae and sclerae normal  HENT: ear canals and TM's normal, nose and mouth without ulcers or lesions  NECK: no adenopathy, no asymmetry, masses, or scars and thyroid normal to palpation  RESP: lungs clear to auscultation - no rales, rhonchi or wheezes  CV: regular rate and rhythm, normal S1 S2, no S3 or S4, no murmur, click or rub, no  peripheral edema and peripheral pulses strong  ABDOMEN: soft, nontender, no hepatosplenomegaly, no masses and bowel sounds normal  MS: no gross musculoskeletal defects noted, no edema  SKIN: no suspicious lesions or rashes  NEURO: Normal strength and tone, mentation intact and speech normal  BACK: no CVA tenderness, no paralumbar tenderness  PSYCH: mentation appears normal, affect normal/bright  LYMPH: no cervical, supraclavicular, axillary, or inguinal adenopathy    Diagnostic Test Results:  Labs reviewed in Epic  No results found for any visits on 07/20/20.        Assessment & Plan       ICD-10-CM    1. Vitamin D deficiency  E55.9 Vitamin D, Cholecalciferol, 25 MCG (1000 UT) TABS   2. HTN, goal below 140/90  I10 metoprolol tartrate (LOPRESSOR) 25 MG tablet        Spoke about low sodium in the diet and switching back to long acting medication and importance of taking the short acting medication at a regular scheduled interval  May need further work -up if unable to control with 2 or 2 agents  Limited work-up negative    Regular exercise    No follow-ups on file.    Nathan Carson MD  [facility]

## 2020-07-21 ENCOUNTER — TELEPHONE (OUTPATIENT)
Dept: FAMILY MEDICINE | Facility: CLINIC | Age: 50
End: 2020-07-21

## 2020-07-21 NOTE — TELEPHONE ENCOUNTER
Reason for Call:  Other     Detailed comments: Patient not sure if he was told to follow up in 3 months in clinic or telephone. Please advise.     Phone Number Patient can be reached at: Work number on file:  [phone] (work)    Best Time: Anytime    Can we leave a detailed message on this number? YES    Call taken on 7/21/2020 at 3:39 PM by Rayna Paul

## 2020-08-28 ENCOUNTER — TRANSFERRED RECORDS (OUTPATIENT)
Dept: HEALTH INFORMATION MANAGEMENT | Facility: CLINIC | Age: 50
End: 2020-08-28

## 2020-09-01 ENCOUNTER — DOCUMENTATION ONLY (OUTPATIENT)
Dept: OPHTHALMOLOGY | Facility: CLINIC | Age: 50
End: 2020-09-01

## 2020-09-25 ENCOUNTER — TRANSFERRED RECORDS (OUTPATIENT)
Dept: HEALTH INFORMATION MANAGEMENT | Facility: CLINIC | Age: 50
End: 2020-09-25

## 2020-10-16 ENCOUNTER — OFFICE VISIT (OUTPATIENT)
Dept: FAMILY MEDICINE | Facility: CLINIC | Age: 50
End: 2020-10-16
Payer: COMMERCIAL

## 2020-10-16 VITALS
BODY MASS INDEX: 28.93 KG/M2 | HEIGHT: 66 IN | HEART RATE: 54 BPM | DIASTOLIC BLOOD PRESSURE: 84 MMHG | TEMPERATURE: 97.6 F | WEIGHT: 180 LBS | SYSTOLIC BLOOD PRESSURE: 144 MMHG

## 2020-10-16 DIAGNOSIS — I10 HTN, GOAL BELOW 140/90: ICD-10-CM

## 2020-10-16 DIAGNOSIS — Z00.00 ROUTINE GENERAL MEDICAL EXAMINATION AT A HEALTH CARE FACILITY: Primary | ICD-10-CM

## 2020-10-16 DIAGNOSIS — Z13.6 CARDIOVASCULAR SCREENING; LDL GOAL LESS THAN 160: ICD-10-CM

## 2020-10-16 DIAGNOSIS — E55.9 VITAMIN D DEFICIENCY DISEASE: ICD-10-CM

## 2020-10-16 LAB
ANION GAP SERPL CALCULATED.3IONS-SCNC: 7 MMOL/L (ref 3–14)
BUN SERPL-MCNC: 20 MG/DL (ref 7–30)
CALCIUM SERPL-MCNC: 9 MG/DL (ref 8.5–10.1)
CHLORIDE SERPL-SCNC: 106 MMOL/L (ref 94–109)
CHOLEST SERPL-MCNC: 223 MG/DL
CO2 SERPL-SCNC: 25 MMOL/L (ref 20–32)
CREAT SERPL-MCNC: 1.17 MG/DL (ref 0.66–1.25)
GFR SERPL CREATININE-BSD FRML MDRD: 72 ML/MIN/{1.73_M2}
GLUCOSE SERPL-MCNC: 71 MG/DL (ref 70–99)
HDLC SERPL-MCNC: 65 MG/DL
LDLC SERPL CALC-MCNC: 147 MG/DL
NONHDLC SERPL-MCNC: 158 MG/DL
POTASSIUM SERPL-SCNC: 4.3 MMOL/L (ref 3.4–5.3)
SODIUM SERPL-SCNC: 138 MMOL/L (ref 133–144)
TRIGL SERPL-MCNC: 56 MG/DL

## 2020-10-16 PROCEDURE — 80048 BASIC METABOLIC PNL TOTAL CA: CPT | Performed by: INTERNAL MEDICINE

## 2020-10-16 PROCEDURE — 82306 VITAMIN D 25 HYDROXY: CPT | Performed by: INTERNAL MEDICINE

## 2020-10-16 PROCEDURE — 80061 LIPID PANEL: CPT | Performed by: INTERNAL MEDICINE

## 2020-10-16 PROCEDURE — 99396 PREV VISIT EST AGE 40-64: CPT | Performed by: INTERNAL MEDICINE

## 2020-10-16 PROCEDURE — 36415 COLL VENOUS BLD VENIPUNCTURE: CPT | Performed by: INTERNAL MEDICINE

## 2020-10-16 RX ORDER — LISINOPRIL 10 MG/1
10 TABLET ORAL DAILY
Qty: 90 TABLET | Refills: 3 | Status: SHIPPED | OUTPATIENT
Start: 2020-10-16 | End: 2021-11-24

## 2020-10-16 RX ORDER — METOPROLOL TARTRATE 25 MG/1
25 TABLET, FILM COATED ORAL 2 TIMES DAILY
Qty: 180 TABLET | Refills: 3 | Status: SHIPPED | OUTPATIENT
Start: 2020-10-16 | End: 2021-02-22

## 2020-10-16 ASSESSMENT — ENCOUNTER SYMPTOMS
CONSTIPATION: 0
HEMATOCHEZIA: 0
HEMATURIA: 0
ABDOMINAL PAIN: 0
COUGH: 0
CHILLS: 0
DIARRHEA: 0

## 2020-10-16 ASSESSMENT — MIFFLIN-ST. JEOR: SCORE: 1620.22

## 2020-10-16 ASSESSMENT — PAIN SCALES - GENERAL: PAINLEVEL: NO PAIN (0)

## 2020-10-16 NOTE — PROGRESS NOTES
SUBJECTIVE:   CC: Cornel Monte is an 49 year old male who presents for preventative health visit.       Patient has been advised of split billing requirements and indicates understanding: Yes  Healthy Habits:     Getting at least 3 servings of Calcium per day:  Yes    Bi-annual eye exam:  Yes    Dental care twice a year:  NO    Sleep apnea or symptoms of sleep apnea:  None    Diet:  Low salt    Frequency of exercise:  2-3 days/week    Duration of exercise:  15-30 minutes    Taking medications regularly:  Yes    Medication side effects:  None    PHQ-2 Total Score: 0    Additional concerns today:  No    Health going well  Work going well            Today's PHQ-2 Score:   PHQ-2 ( 1999 [employer]) 10/16/2020   Q1: Little interest or pleasure in doing things -   Q2: Feeling down, depressed or hopeless -   PHQ-2 Score -   Q1: Little interest or pleasure in doing things -   Q2: Feeling down, depressed or hopeless -   PHQ-2 Score Incomplete       Abuse: Current or Past(Physical, Sexual or Emotional)- No  Do you feel safe in your environment? Yes  142/86   143/80        Social History     Tobacco Use     Smoking status: Never Smoker     Smokeless tobacco: Never Used     Tobacco comment: smoke free household.   Substance Use Topics     Alcohol use: No     If you drink alcohol do you typically have >3 drinks per day or >7 drinks per week? No    Alcohol Use 9/25/2019   Prescreen: >3 drinks/day or >7 drinks/week? No   Prescreen: >3 drinks/day or >7 drinks/week? -       Last PSA:   PSA   Date Value Ref Range Status   04/27/2009 0.82 0 - 4 ug/L Final       Reviewed orders with patient. Reviewed health maintenance and updated orders accordingly - Yes  Lab work is in process  Labs reviewed in EPIC  BP Readings from Last 3 Encounters:   10/16/20 (!) 144/84   07/20/20 132/82   04/01/20 124/81    Wt Readings from Last 3 Encounters:   10/16/20 81.6 kg (180 lb)   07/20/20 82.2 kg (181 lb 4.8 oz)   02/12/20 81.9 kg (180 lb 8 oz)                   Patient Active Problem List   Diagnosis     CARDIOVASCULAR SCREENING; LDL GOAL LESS THAN 160     Preglaucoma     Vitamin D deficiency disease     Cup to disc asymmetry, bilateral     Branch retinal vein occlusion of left eye with macular edema- sees VRS Dr Marisa Chavez     Past Surgical History:   Procedure Laterality Date     AVASTIN (BEVACIZUMAB) 1.25 MG INTRAVITREAL INJECTION OS (LEFT EYE) Left     4-15-19 Dr Marisa Chavez, [facility]       Social History     Tobacco Use     Smoking status: Never Smoker     Smokeless tobacco: Never Used     Tobacco comment: smoke free household.   Substance Use Topics     Alcohol use: No     Family History   Problem Relation Age of Onset     Hypertension Father      Family History Negative Other      Cancer No family hx of      Diabetes No family hx of      Cerebrovascular Disease No family hx of      Thyroid Disease No family hx of      Glaucoma No family hx of      Macular Degeneration No family hx of          Current Outpatient Medications   Medication Sig Dispense Refill     latanoprost (XALATAN) 0.005 % ophthalmic solution Place 1 drop into both eyes At Bedtime 1 Bottle 12     lisinopril (ZESTRIL) 10 MG tablet Take 1 tablet (10 mg) by mouth daily 90 tablet 3     metoprolol tartrate (LOPRESSOR) 25 MG tablet Take 1 tablet (25 mg) by mouth 2 times daily 180 tablet 3     Vitamin D, Cholecalciferol, 25 MCG (1000 UT) TABS Take 1 tablet (1,000 Units) by mouth daily (Patient not taking: Reported on 10/16/2020) 90 tablet 3     No Known Allergies    Reviewed and updated as needed this visit by clinical staff                 Reviewed and updated as needed this visit by Provider                    Review of Systems   Constitutional: Negative for chills.   HENT: Negative for congestion.    Respiratory: Negative for cough.    Cardiovascular: Negative for chest pain.   Gastrointestinal: Negative for abdominal pain, constipation, diarrhea and hematochezia.   Genitourinary: Negative  for hematuria.     CONSTITUTIONAL: NEGATIVE for fever, chills, change in weight  INTEGUMENTARY/SKIN: NEGATIVE for worrisome rashes, moles or lesions  EYES: NEGATIVE for vision changes or irritation  ENT: NEGATIVE for ear, mouth and throat problems  RESP: NEGATIVE for significant cough or SOB  CV: NEGATIVE for chest pain, palpitations or peripheral edema  GI: NEGATIVE for nausea, abdominal pain, heartburn, or change in bowel habits   male: negative for dysuria, hematuria, decreased urinary stream, erectile dysfunction, urethral discharge  MUSCULOSKELETAL: NEGATIVE for significant arthralgias or myalgia  NEURO: NEGATIVE for weakness, dizziness or paresthesias  PSYCHIATRIC: NEGATIVE for changes in mood or affect    OBJECTIVE:   There were no vitals taken for this visit.    Physical Exam  GENERAL: healthy, alert and no distress  EYES: Eyes grossly normal to inspection, PERRL and conjunctivae and sclerae normal  HENT: ear canals and TM's normal, nose and mouth without ulcers or lesions  NECK: no adenopathy, no asymmetry, masses, or scars and thyroid normal to palpation  RESP: lungs clear to auscultation - no rales, rhonchi or wheezes  CV: regular rate and rhythm, normal S1 S2, no S3 or S4, no murmur, click or rub, no peripheral edema and peripheral pulses strong  ABDOMEN: soft, nontender, no hepatosplenomegaly, no masses and bowel sounds normal  MS: no gross musculoskeletal defects noted, no edema    Diagnostic Test Results:  Labs reviewed in Epic  Results for orders placed or performed in visit on 10/16/20   Basic metabolic panel  (Ca, Cl, CO2, Creat, Gluc, K, Na, BUN)     Status: None   Result Value Ref Range    Sodium 138 133 - 144 mmol/L    Potassium 4.3 3.4 - 5.3 mmol/L    Chloride 106 94 - 109 mmol/L    Carbon Dioxide 25 20 - 32 mmol/L    Anion Gap 7 3 - 14 mmol/L    Glucose 71 70 - 99 mg/dL    Urea Nitrogen 20 7 - 30 mg/dL    Creatinine 1.17 0.66 - 1.25 mg/dL    GFR Estimate 72 >60 mL/min/[1.73_m2]    GFR  "Estimate If Black 84 >60 mL/min/[1.73_m2]    Calcium 9.0 8.5 - 10.1 mg/dL   Lipid panel reflex to direct LDL Fasting     Status: Abnormal   Result Value Ref Range    Cholesterol 223 (H) <200 mg/dL    Triglycerides 56 <150 mg/dL    HDL Cholesterol 65 >39 mg/dL    LDL Cholesterol Calculated 147 (H) <100 mg/dL    Non HDL Cholesterol 158 (H) <130 mg/dL   Vitamin D Deficiency     Status: None   Result Value Ref Range    Vitamin D Deficiency screening 28 20 - 75 ug/L       ASSESSMENT/PLAN:   Cornel was seen today for physical.    Diagnoses and all orders for this visit:    Routine general medical examination at a health care facility  -     OPTOMETRY REFERRAL    Vitamin D deficiency disease  -     Vitamin D Deficiency    CARDIOVASCULAR SCREENING; LDL GOAL LESS THAN 160  -     Basic metabolic panel  (Ca, Cl, CO2, Creat, Gluc, K, Na, BUN)  -     Lipid panel reflex to direct LDL Fasting    HTN, goal below 140/90  -     lisinopril (ZESTRIL) 10 MG tablet; Take 1 tablet (10 mg) by mouth daily  -     metoprolol tartrate (LOPRESSOR) 25 MG tablet; Take 1 tablet (25 mg) by mouth 2 times daily        Patient has been advised of split billing requirements and indicates understanding: Yes  COUNSELING:   Reviewed preventive health counseling, as reflected in patient instructions       Regular exercise       Healthy diet/nutrition       Vision screening       Hearing screening       Colon cancer screening    Estimated body mass index is 29.49 kg/m  as calculated from the following:    Height as of 9/10/18: 1.67 m (5' 5.75\").    Weight as of 7/20/20: 82.2 kg (181 lb 4.8 oz).       ICD-10-CM    1. Routine general medical examination at a health care facility  Z00.00 OPTOMETRY REFERRAL   2. Vitamin D deficiency disease  E55.9 Vitamin D Deficiency   3. CARDIOVASCULAR SCREENING; LDL GOAL LESS THAN 160  Z13.6 Basic metabolic panel  (Ca, Cl, CO2, Creat, Gluc, K, Na, BUN)     Lipid panel reflex to direct LDL Fasting   4. HTN, goal below 140/90 "  I10 lisinopril (ZESTRIL) 10 MG tablet     metoprolol tartrate (LOPRESSOR) 25 MG tablet         He reports that he has never smoked. He has never used smokeless tobacco.      Counseling Resources:  ATP IV Guidelines  Pooled Cohorts Equation Calculator  FRAX Risk Assessment  ICSI Preventive Guidelines  Dietary Guidelines for Americans, 2010  [employer]'s My[employer]ate  ASA Prophylaxis  Lung CA Screening    Nathan Carson MD  [facility]

## 2020-10-16 NOTE — LETTER
[facility]   [address] [place]  [address]  [phone]                                   October 29, 2020    Cornel Sureshlauren Hajadevynjean paul  [address]  [facility] [address]        Dear Cornel Unger, the cholesterol results are at a level that would benefit from a low dose of statin medication like atorvastatin   We will discuss at next visit     Results for orders placed or performed in visit on 10/16/20   Basic metabolic panel  (Ca, Cl, CO2, Creat, Gluc, K, Na, BUN)     Status: None   Result Value Ref Range    Sodium 138 133 - 144 mmol/L    Potassium 4.3 3.4 - 5.3 mmol/L    Chloride 106 94 - 109 mmol/L    Carbon Dioxide 25 20 - 32 mmol/L    Anion Gap 7 3 - 14 mmol/L    Glucose 71 70 - 99 mg/dL    Urea Nitrogen 20 7 - 30 mg/dL    Creatinine 1.17 0.66 - 1.25 mg/dL    GFR Estimate 72 >60 mL/min/[1.73_m2]    GFR Estimate If Black 84 >60 mL/min/[1.73_m2]    Calcium 9.0 8.5 - 10.1 mg/dL   Lipid panel reflex to direct LDL Fasting     Status: Abnormal   Result Value Ref Range    Cholesterol 223 (H) <200 mg/dL    Triglycerides 56 <150 mg/dL    HDL Cholesterol 65 >39 mg/dL    LDL Cholesterol Calculated 147 (H) <100 mg/dL    Non HDL Cholesterol 158 (H) <130 mg/dL   Vitamin D Deficiency     Status: None   Result Value Ref Range    Vitamin D Deficiency screening 28 20 - 75 ug/L       If you have any questions please call the clinic at [phone]    Sincerely,    Nathan Carson MD  hnr

## 2020-10-17 LAB — DEPRECATED CALCIDIOL+CALCIFEROL SERPL-MC: 28 UG/L (ref 20–75)

## 2020-11-17 ENCOUNTER — TELEPHONE (OUTPATIENT)
Dept: FAMILY MEDICINE | Facility: CLINIC | Age: 50
End: 2020-11-17

## 2020-11-17 NOTE — TELEPHONE ENCOUNTER
Reason for Call:  Other call back    Detailed comments: Patient called stating he was tested positive for covid over the weekend and has questions for the nurse about it.    Phone Number Patient can be reached at: Cell number on file:    Telephone Information:   Mobile [phone]       Best Time: anytime    Can we leave a detailed message on this number? YES    Call taken on 11/17/2020 at 9:05 AM by Marisa Hernandez

## 2020-11-17 NOTE — TELEPHONE ENCOUNTER
Attempt # 1  Called patient at home number.  Mobile [phone]       Was call answered?  Yes, patient denies questions, just wanted his doctor to know he tested positive for Covid.  Nurse advised stay hydrated, eat whatever he can whenever he can and rest. Patient verbalized understanding and agreement with plan and had no questions.       Routing to PCP to review             Debbie Guaman RN  [facility]

## 2020-11-23 ENCOUNTER — MYC MEDICAL ADVICE (OUTPATIENT)
Dept: FAMILY MEDICINE | Facility: CLINIC | Age: 50
End: 2020-11-23

## 2020-11-23 NOTE — LETTER
[address] [address]  Phone: [phone]  Fax: [phone]    November 23, 2020        Cornel Monte  [address] [facility] [address]          To whom it may concern:    RE: Cornel Monte    Patient may return to work 11/30/2020 with the following:  No restrictions  He is out until that time with symptoms and complications from covid -19.    Please contact me for questions or concerns.      Sincerely,        Nathan Carson MD

## 2020-12-13 ENCOUNTER — HEALTH MAINTENANCE LETTER (OUTPATIENT)
Age: 50
End: 2020-12-13

## 2020-12-23 ENCOUNTER — DOCUMENTATION ONLY (OUTPATIENT)
Dept: OPHTHALMOLOGY | Facility: CLINIC | Age: 50
End: 2020-12-23

## 2021-01-01 ENCOUNTER — MYC MEDICAL ADVICE (OUTPATIENT)
Dept: FAMILY MEDICINE | Facility: CLINIC | Age: 51
End: 2021-01-01

## 2021-01-01 DIAGNOSIS — K29.00 ACUTE SUPERFICIAL GASTRITIS WITHOUT HEMORRHAGE: Primary | ICD-10-CM

## 2021-01-04 RX ORDER — PANTOPRAZOLE SODIUM 40 MG/1
40 TABLET, DELAYED RELEASE ORAL DAILY
Qty: 31 TABLET | Refills: 1 | Status: SHIPPED | OUTPATIENT
Start: 2021-01-04 | End: 2021-02-04

## 2021-01-04 NOTE — TELEPHONE ENCOUNTER
To provider:  Please see Jooxhart message. Triage guidelines sent.  Thank you.     Grace Levy RN

## 2021-01-19 ENCOUNTER — OFFICE VISIT (OUTPATIENT)
Dept: OPHTHALMOLOGY | Facility: CLINIC | Age: 51
End: 2021-01-19
Attending: INTERNAL MEDICINE
Payer: COMMERCIAL

## 2021-01-19 DIAGNOSIS — H52.13 MYOPIA OF BOTH EYES WITH REGULAR ASTIGMATISM AND PRESBYOPIA: ICD-10-CM

## 2021-01-19 DIAGNOSIS — H40.053 BORDERLINE GLAUCOMA OF BOTH EYES WITH OCULAR HYPERTENSION: ICD-10-CM

## 2021-01-19 DIAGNOSIS — Z01.00 EXAMINATION OF EYES AND VISION: Primary | ICD-10-CM

## 2021-01-19 DIAGNOSIS — H34.8320 BRANCH RETINAL VEIN OCCLUSION OF LEFT EYE WITH MACULAR EDEMA (H): ICD-10-CM

## 2021-01-19 DIAGNOSIS — H52.223 MYOPIA OF BOTH EYES WITH REGULAR ASTIGMATISM AND PRESBYOPIA: ICD-10-CM

## 2021-01-19 DIAGNOSIS — H52.4 MYOPIA OF BOTH EYES WITH REGULAR ASTIGMATISM AND PRESBYOPIA: ICD-10-CM

## 2021-01-19 PROCEDURE — 92014 COMPRE OPH EXAM EST PT 1/>: CPT | Performed by: STUDENT IN AN ORGANIZED HEALTH CARE EDUCATION/TRAINING PROGRAM

## 2021-01-19 PROCEDURE — 92015 DETERMINE REFRACTIVE STATE: CPT | Performed by: STUDENT IN AN ORGANIZED HEALTH CARE EDUCATION/TRAINING PROGRAM

## 2021-01-19 ASSESSMENT — VISUAL ACUITY
OS_SC+: -1
OD_SC+: -2
OS_SC: 20/40
OS_PH_SC: 20/20
OD_SC: 20/20
OS_PH_SC+: -2
METHOD: SNELLEN - LINEAR

## 2021-01-19 ASSESSMENT — CONF VISUAL FIELD
OD_NORMAL: 1
OS_NORMAL: 1
METHOD: COUNTING FINGERS

## 2021-01-19 ASSESSMENT — EXTERNAL EXAM - LEFT EYE: OS_EXAM: NORMAL

## 2021-01-19 ASSESSMENT — REFRACTION_MANIFEST
OS_CYLINDER: +1.50
OD_SPHERE: -0.50
OD_AXIS: 180
OD_CYLINDER: +0.75
OS_AXIS: 005
OS_ADD: +2.00
OS_SPHERE: -1.00
OD_ADD: +2.00

## 2021-01-19 ASSESSMENT — SLIT LAMP EXAM - LIDS
COMMENTS: 2+ SCLERAL SHOW INFERIOR
COMMENTS: 2+ SCLERAL SHOW INFERIOR

## 2021-01-19 ASSESSMENT — TONOMETRY
OS_IOP_MMHG: 17
IOP_METHOD: APPLANATION
OD_IOP_MMHG: 17

## 2021-01-19 ASSESSMENT — EXTERNAL EXAM - RIGHT EYE: OD_EXAM: NORMAL

## 2021-01-19 ASSESSMENT — CUP TO DISC RATIO
OS_RATIO: 0.5 UD
OD_RATIO: 0.6 UD

## 2021-01-19 NOTE — PROGRESS NOTES
Current Eye Medications:  Latanoprost at bedtime both eyes, last took around 9-10 pm.     Subjective:  Complete eye exam. Vision is doing well in distance right eye. Vision is a little dark left eye. Uses OTC readers +2.00 if reading for long time. Has trouble after reading for long periods of time when taking glasses off. Saw Dr. Chavez at [facility] for Branch retinal vein occlusion. Patient had injection in left eye about 3 months ago (no injection per notes from Dr. Chavez though), was told done with them now. No eye pain or discomfort in either eye.      Objective:  See Ophthalmology Exam.       Assessment:  Cornel Monte is a 50 year old male who presents with:   Encounter Diagnoses   Name Primary?     Examination of eyes and vision      Myopia of both eyes with regular astigmatism and presbyopia        Branch retinal vein occlusion of left eye with macular edema- sees [facility] Dr Marisa Chavez Stable.     Borderline glaucoma of both eyes with ocular hypertension Intraocular pressure 17/17 today on latanoprost.        Plan:  Continue Latanoprost (green top) at bedtime both eyes     Continue over the counter readers     Continue care with Dr. Chavez at [facility] as she recommends    Bhavesh Kim MD  [phone]

## 2021-01-19 NOTE — LETTER
1/19/2021         RE: Cornel Monte  [address] [facility] [address]        Dear Colleague,    Thank you for referring your patient, Cornel Monte, to the [facility]. Please see a copy of my visit note below.     Current Eye Medications:  Latanoprost at bedtime both eyes, last took around 9-10 pm.     Subjective:  Complete eye exam. Vision is doing well in distance right eye. Vision is a little dark left eye. Uses OTC readers +2.00 if reading for long time. Has trouble after reading for long periods of time when taking glasses off. Saw Dr. Chavez at [facility] for Branch retinal vein occlusion. Patient had injection in left eye about 3 months ago (no injection per notes from Dr. Chavez though), was told done with them now. No eye pain or discomfort in either eye.      Objective:  See Ophthalmology Exam.       Assessment:  Cornel Monte is a 50 year old male who presents with:   Encounter Diagnoses   Name Primary?     Examination of eyes and vision      Myopia of both eyes with regular astigmatism and presbyopia        Branch retinal vein occlusion of left eye with macular edema- sees [facility] Dr Marisa Chavez Stable.     Borderline glaucoma of both eyes with ocular hypertension Intraocular pressure 17/17 today on latanoprost.        Plan:  Continue Latanoprost (green top) at bedtime both eyes     Continue over the counter readers     Continue care with Dr. Chavez at [facility] as she recommends    Bhavesh Kim MD  [phone]          Again, thank you for allowing me to participate in the care of your patient.        Sincerely,        Bhavesh Kim MD

## 2021-01-19 NOTE — PATIENT INSTRUCTIONS
Continue Latanoprost (green top) at bedtime both eyes     Continue over the counter readers     Continue care with Dr. Chavez at [facility] as she recommends    Bhavesh Kim MD  [phone]

## 2021-02-22 ENCOUNTER — OFFICE VISIT (OUTPATIENT)
Dept: FAMILY MEDICINE | Facility: CLINIC | Age: 51
End: 2021-02-22
Payer: COMMERCIAL

## 2021-02-22 VITALS
TEMPERATURE: 97.4 F | HEIGHT: 66 IN | HEART RATE: 65 BPM | BODY MASS INDEX: 29.09 KG/M2 | DIASTOLIC BLOOD PRESSURE: 72 MMHG | SYSTOLIC BLOOD PRESSURE: 138 MMHG | OXYGEN SATURATION: 97 % | WEIGHT: 181 LBS

## 2021-02-22 DIAGNOSIS — R00.2 PALPITATIONS: Primary | ICD-10-CM

## 2021-02-22 PROCEDURE — 93000 ELECTROCARDIOGRAM COMPLETE: CPT | Performed by: INTERNAL MEDICINE

## 2021-02-22 PROCEDURE — 99214 OFFICE O/P EST MOD 30 MIN: CPT | Performed by: INTERNAL MEDICINE

## 2021-02-22 RX ORDER — METOPROLOL SUCCINATE 50 MG/1
50 TABLET, EXTENDED RELEASE ORAL DAILY
Qty: 90 TABLET | Refills: 4 | Status: SHIPPED | OUTPATIENT
Start: 2021-02-22 | End: 2021-12-10

## 2021-02-22 ASSESSMENT — MIFFLIN-ST. JEOR: SCORE: 1623.76

## 2021-02-22 NOTE — PROGRESS NOTES
"Assessment & Plan   Problem List Items Addressed This Visit     None      Visit Diagnoses     Palpitations    -  Primary    Relevant Medications    metoprolol succinate ER (TOPROL-XL) 50 MG 24 hr tablet    Other Relevant Orders    EKG 12-lead complete w/read - Clinics (Completed)         Patient with worsening palpitations - likely from working two jobs being up all night  Drinking more caffiene and coffee  Will try to   Will try to 0956}   will try to switch back to metoprolol XL and avoid the short acting up and down            BMI:   Estimated body mass index is 29.21 kg/m  as calculated from the following:    Height as of this encounter: 1.676 m (5' 6\").    Weight as of this encounter: 82.1 kg (181 lb).   Weight management plan: Discussed healthy diet and exercise guidelines    Regular exercise    No follow-ups on file.    Nathan Carson MD  [facility] IZABEL Unger is a 50 year old who presents for the following health issues     HPI     Follow up on rapid heart beat  becky and back painful 2 days   No eating and then improved  2-3 weeks off the tea coffee  1/14/2021  Working overnight  2-3 oclock in the morning   1 cup cooffee  30 min - heart rate running high   Fight or flight   No lightheaded or dizziny  mornign 2 afternoon 1 .     Any time can happen  Muscle stiffnes        Not at all since then   Once in awahle then calms down   Once in awhile 1-2 days       Review of Systems   Constitutional, HEENT, cardiovascular, pulmonary, gi and gu systems are negative, except as otherwise noted.      Objective    /72   Pulse 65   Temp 97.4  F (36.3  C)   Ht 1.676 m (5' 6\")   Wt 82.1 kg (181 lb)   SpO2 97%   BMI 29.21 kg/m    Body mass index is 29.21 kg/m .  Physical Exam   GENERAL: healthy, alert and no distress  EYES: Eyes grossly normal to inspection, PERRL and conjunctivae and sclerae normal  HENT: ear canals and TM's normal, nose and mouth without ulcers or " lesions  NECK: no adenopathy, no asymmetry, masses, or scars and thyroid normal to palpation  RESP: lungs clear to auscultation - no rales, rhonchi or wheezes  CV: regular rate and rhythm, normal S1 S2, no S3 or S4, no murmur, click or rub, no peripheral edema and peripheral pulses strong  ABDOMEN: soft, nontender, no hepatosplenomegaly, no masses and bowel sounds normal  MS: no gross musculoskeletal defects noted, no edema  SKIN: no suspicious lesions or rashes  NEURO: Normal strength and tone, mentation intact and speech normal  BACK: no CVA tenderness, no paralumbar tenderness  PSYCH: mentation appears normal, affect normal/bright  LYMPH: no cervical, supraclavicular, axillary, or inguinal adenopathy    No results found for any visits on 02/22/21.

## 2021-04-14 ENCOUNTER — NURSE TRIAGE (OUTPATIENT)
Dept: FAMILY MEDICINE | Facility: CLINIC | Age: 51
End: 2021-04-14

## 2021-04-14 NOTE — TELEPHONE ENCOUNTER
"Patient reports pain in left leg after working in garage yesterday lifting heavy bags of salt. Pain goes down left leg but back does not hurt. He was unable to climb stairs last night. He has been trying to do light stretching which helps a little.  Has not tried otc pain medications. No weakness, numbness, bowel/bladder problems. Appointment scheduled.     Additional Information    Back pain radiating (shooting) into leg(s)    Negative: Pain radiates into groin, scrotum    Negative: Blood in urine (red, pink, or tea-colored)    Negative: Vomiting and pain over lower ribs of back (i.e., flank - kidney area)    Negative: Weakness of a leg or foot (e.g., unable to bear weight, dragging foot)    Negative: Patient sounds very sick or weak to the triager    Negative: SEVERE back pain of sudden onset and age > 60    Negative: SEVERE abdominal pain (e.g., excruciating)    Negative: Abdominal pain and age > 60    Negative: Unable to urinate (or only a few drops) and bladder feels very full    Negative: Loss of bladder or bowel control (urine or bowel incontinence; wetting self, leaking stool) of new onset    Negative: Numbness (loss of sensation) in groin or rectal area    Negative: Fever > 100.5 F (38.1 C) and flank pain    Negative: Pain or burning with passing urine (urination)    Pain radiates into the thigh or further down the leg    Answer Assessment - Initial Assessment Questions  1. ONSET: \"When did the pain start?\"       Yesterday evening  2. LOCATION: \"Where is the pain located?\"       Down left leg  3. PAIN: \"How bad is the pain?\"    (Scale 1-10; or mild, moderate, severe)    -  MILD (1-3): doesn't interfere with normal activities     -  MODERATE (4-7): interferes with normal activities (e.g., work or school) or awakens from sleep, limping     -  SEVERE (8-10): excruciating pain, unable to do any normal activities, unable to walk      7, can't walk up stairs  4. WORK OR EXERCISE: \"Has there been any recent work " "or exercise that involved this part of the body?\"       Moving bags of salt  5. CAUSE: \"What do you think is causing the leg pain?\"      Working in the garage  6. OTHER SYMPTOMS: \"Do you have any other symptoms?\" (e.g., chest pain, back pain, breathing difficulty, swelling, rash, fever, numbness, weakness)      none  7. PREGNANCY: \"Is there any chance you are pregnant?\" \"When was your last menstrual period?\"      na    Protocols used: LEG PAIN-A-OH, BACK PAIN-A-OH      "

## 2021-04-15 ENCOUNTER — ANCILLARY PROCEDURE (OUTPATIENT)
Dept: GENERAL RADIOLOGY | Facility: CLINIC | Age: 51
End: 2021-04-15
Attending: PHYSICIAN ASSISTANT
Payer: MEDICAID

## 2021-04-15 ENCOUNTER — OFFICE VISIT (OUTPATIENT)
Dept: FAMILY MEDICINE | Facility: CLINIC | Age: 51
End: 2021-04-15
Payer: MEDICAID

## 2021-04-15 VITALS
HEIGHT: 66 IN | HEART RATE: 60 BPM | OXYGEN SATURATION: 100 % | RESPIRATION RATE: 16 BRPM | TEMPERATURE: 97.9 F | WEIGHT: 180 LBS | DIASTOLIC BLOOD PRESSURE: 60 MMHG | SYSTOLIC BLOOD PRESSURE: 120 MMHG | BODY MASS INDEX: 28.93 KG/M2

## 2021-04-15 DIAGNOSIS — M54.16 LUMBAR RADICULOPATHY: Primary | ICD-10-CM

## 2021-04-15 DIAGNOSIS — M54.16 LUMBAR RADICULOPATHY: ICD-10-CM

## 2021-04-15 PROCEDURE — 99214 OFFICE O/P EST MOD 30 MIN: CPT | Performed by: PHYSICIAN ASSISTANT

## 2021-04-15 PROCEDURE — 72100 X-RAY EXAM L-S SPINE 2/3 VWS: CPT | Performed by: RADIOLOGY

## 2021-04-15 RX ORDER — HYDROCODONE BITARTRATE AND ACETAMINOPHEN 5; 325 MG/1; MG/1
1 TABLET ORAL EVERY 6 HOURS PRN
Qty: 12 TABLET | Refills: 0 | Status: SHIPPED | OUTPATIENT
Start: 2021-04-15 | End: 2021-04-18

## 2021-04-15 RX ORDER — PREDNISONE 20 MG/1
TABLET ORAL
Qty: 20 TABLET | Refills: 0 | Status: SHIPPED | OUTPATIENT
Start: 2021-04-15 | End: 2021-12-10

## 2021-04-15 ASSESSMENT — MIFFLIN-ST. JEOR: SCORE: 1619.22

## 2021-04-15 NOTE — PROGRESS NOTES
"    Assessment & Plan     Lumbar radiculopathy  - predniSONE (DELTASONE) 20 MG tablet; Take 3 tabs by mouth daily x 3 days, then 2 tabs daily x 3 days, then 1 tab daily x 3 days, then 1/2 tab daily x 3 days.  - HYDROcodone-acetaminophen (NORCO) 5-325 MG tablet; Take 1 tablet by mouth every 6 hours as needed for pain  - Orthopedic & Spine  Referral; Future  - XR Lumbar Spine 2/3 Views; Future        Return in about 1 week (around 4/22/2021), or per Neurosurgery recommendations.    Darryn Montoya PA-C  [facility] IZABLE Unger is a 50 year old who presents for the following health issues  accompanied by his self:    HPI     Musculoskeletal problem/pain  Onset/Duration: 3 days  Description  Location: leg - left  Joint Swelling: YES  Redness: no  Pain: YES  Warmth: no  Intensity:  6/10  Progression of Symptoms:  improving  Accompanying signs and symptoms:   Fevers: no  Numbness/tingling/weakness: no  History  Trauma to the area: no  Recent illness:  no  Previous similar problem: no  Previous evaluation:  no  Precipitating or alleviating factors:  Aggravating factors include: standing and walking  Therapies tried and outcome:  Ibuprofen    Patient works as an  and bends and lifts frequently.  Unsure of specific injury.      Review of Systems   Constitutional, HEENT, cardiovascular, pulmonary, gi and gu systems are negative, except as otherwise noted.      Objective    /60   Pulse 60   Temp 97.9  F (36.6  C) (Oral)   Resp 16   Ht 1.676 m (5' 6\")   Wt 81.6 kg (180 lb)   SpO2 100%   BMI 29.05 kg/m    Body mass index is 29.05 kg/m .  Physical Exam   GENERAL: healthy, alert and no distress  MS: no gross musculoskeletal defects noted, no edema  SKIN: no suspicious lesions or rashes  Comprehensive back pain exam:  Tenderness of L Gluteus and midline lumbar with palpation, Pain limits the following motions: Flexion, extension, rotation, Unable to " complete strength testing due to pain and Straight leg raise negative bilaterally, unable to complete

## 2021-04-15 NOTE — LETTER
[facility]  [address] [facility] [address]  Phone: [phone]    April 15, 2021        Cornel Monte  [address] [facility] [address]          To whom it may concern:    RE: Cornel Monte    Patient was seen and treated today at our clinic and missed work.  Patient may return to work 4/19/2021 with the following:  Work restrictions to be drafted by specialist.     Please contact me for questions or concerns.      Sincerely,        Darryn Montoya PA-C

## 2021-06-21 DIAGNOSIS — H34.8320 BRANCH RETINAL VEIN OCCLUSION OF LEFT EYE WITH MACULAR EDEMA (H): ICD-10-CM

## 2021-06-21 DIAGNOSIS — H40.053 BORDERLINE GLAUCOMA OF BOTH EYES WITH OCULAR HYPERTENSION: ICD-10-CM

## 2021-06-22 RX ORDER — LATANOPROST 50 UG/ML
1 SOLUTION/ DROPS OPHTHALMIC AT BEDTIME
Qty: 2.5 ML | Refills: 11 | Status: SHIPPED | OUTPATIENT
Start: 2021-06-22 | End: 2021-06-29

## 2021-06-29 ENCOUNTER — TELEPHONE (OUTPATIENT)
Dept: OPHTHALMOLOGY | Facility: CLINIC | Age: 51
End: 2021-06-29

## 2021-06-29 DIAGNOSIS — H40.053 BORDERLINE GLAUCOMA OF BOTH EYES WITH OCULAR HYPERTENSION: ICD-10-CM

## 2021-06-29 DIAGNOSIS — H34.8320 BRANCH RETINAL VEIN OCCLUSION OF LEFT EYE WITH MACULAR EDEMA (H): ICD-10-CM

## 2021-06-29 RX ORDER — LATANOPROST 50 UG/ML
1 SOLUTION/ DROPS OPHTHALMIC AT BEDTIME
Qty: 2.5 ML | Refills: 11 | Status: SHIPPED | OUTPATIENT
Start: 2021-06-29 | End: 2022-07-25

## 2021-06-29 NOTE — TELEPHONE ENCOUNTER
Recommend refilling drops per last visit note on 1/19/21 by Dr. Kim: Continue Latanoprost (green top) at bedtime both eyes.

## 2021-06-29 NOTE — TELEPHONE ENCOUNTER
Sent refill per Dr. Kim's last visit note: Continue Latanoprost (green top) at bedtime both eyes

## 2021-06-29 NOTE — TELEPHONE ENCOUNTER
Patient calling to refill Latanoprost. He uses [facility] for pharmacy. Any questions please call Phone: [phone]. He asked for an email confirmation when completed.

## 2021-07-06 ENCOUNTER — TELEPHONE (OUTPATIENT)
Dept: FAMILY MEDICINE | Facility: CLINIC | Age: 51
End: 2021-07-06

## 2021-07-06 NOTE — TELEPHONE ENCOUNTER
Prior Authorization Retail Medication Request    Medication/Dose: latanoprost 0.005% solution  ICD code (if different than what is on RX):    Previously Tried and Failed:    Rationale:      Insurance Name:  medco   Insurance ID:  [id number]      Thank You  Myrna Abraham [facility]  [phone]

## 2021-07-07 NOTE — TELEPHONE ENCOUNTER
Central Prior Authorization Team  Phone: [phone]    PA Initiation    Medication: latanoprost 0.005% solution  Insurance Company: [employer] - Phone [phone] Fax [phone]  Pharmacy Filling the Rx: [place], [place] - [address], [address]  Filling Pharmacy Phone: [phone]  Filling Pharmacy Fax:    Start Date: 7/7/2021

## 2021-07-08 NOTE — TELEPHONE ENCOUNTER
Prior Authorization Approval    Authorization Effective Date: 6/7/2021  Authorization Expiration Date: 7/6/2024  Medication: latanoprost 0.005% solution- APPROVED   Approved Dose/Quantity:   Reference #:     Insurance Company: [employer] - Phone [phone] Fax [phone]  Expected CoPay:       CoPay Card Available:      Foundation Assistance Needed:    Which Pharmacy is filling the prescription (Not needed for infusion/clinic administered): [address], [address]  Pharmacy Notified: Yes  Patient Notified:  **Instructed pharmacy to notify patient when script is ready to /ship.**

## 2021-07-23 ENCOUNTER — OFFICE VISIT (OUTPATIENT)
Dept: OPHTHALMOLOGY | Facility: CLINIC | Age: 51
End: 2021-07-23
Payer: COMMERCIAL

## 2021-07-23 DIAGNOSIS — H40.053 BORDERLINE GLAUCOMA OF BOTH EYES WITH OCULAR HYPERTENSION: Primary | ICD-10-CM

## 2021-07-23 DIAGNOSIS — H34.8320 BRANCH RETINAL VEIN OCCLUSION OF LEFT EYE WITH MACULAR EDEMA (H): ICD-10-CM

## 2021-07-23 PROCEDURE — 92012 INTRM OPH EXAM EST PATIENT: CPT | Performed by: STUDENT IN AN ORGANIZED HEALTH CARE EDUCATION/TRAINING PROGRAM

## 2021-07-23 PROCEDURE — 92083 EXTENDED VISUAL FIELD XM: CPT | Performed by: STUDENT IN AN ORGANIZED HEALTH CARE EDUCATION/TRAINING PROGRAM

## 2021-07-23 PROCEDURE — 92133 CPTRZD OPH DX IMG PST SGM ON: CPT | Performed by: STUDENT IN AN ORGANIZED HEALTH CARE EDUCATION/TRAINING PROGRAM

## 2021-07-23 ASSESSMENT — VISUAL ACUITY
OD_SC: 20/20
OS_SC: 20/30
METHOD: SNELLEN - LINEAR
OS_SC+: -2
OD_SC+: -1

## 2021-07-23 ASSESSMENT — SLIT LAMP EXAM - LIDS
COMMENTS: 2+ SCLERAL SHOW INFERIOR
COMMENTS: 2+ SCLERAL SHOW INFERIOR

## 2021-07-23 ASSESSMENT — TONOMETRY
OS_IOP_MMHG: 24
IOP_METHOD: APPLANATION
OD_IOP_MMHG: 24

## 2021-07-23 ASSESSMENT — EXTERNAL EXAM - LEFT EYE: OS_EXAM: NORMAL

## 2021-07-23 ASSESSMENT — CUP TO DISC RATIO
OS_RATIO: 0.5 UD
OD_RATIO: 0.6 UD

## 2021-07-23 ASSESSMENT — EXTERNAL EXAM - RIGHT EYE: OD_EXAM: NORMAL

## 2021-07-23 NOTE — PROGRESS NOTES
Current Eye Medications:    Latanoprost at bedtime both eyes, did not take drops last night, took the night before     Subjective: here for HVF/OCT and IOP today for glaucoma. Eyes have been stable for a year now.  Ever since the avastin he has sometimes little twinges of pain in the left eye, but retina said that everything is fine and stable.     Objective:  See Ophthalmology Exam.       Assessment:  Cornel Monte is a 50 year old male who presents with:   Encounter Diagnoses   Name Primary?     Borderline glaucoma of both eyes with ocular hypertension Intraocular pressure 24/24 (did not use his latanoprost drop last night). Stable OCT and visual field both eyes.     OCT optic nerve: avg retinal nerve fiber layer 93/97, within normal limits and stable both eyes.     Davidson visual field (HVF) 24-2: within normal limits both eyes.      Branch retinal vein occlusion of left eye with macular edema- sees [facility] Dr Marisa Chavez        Plan:  Continue Latanoprost (green top) at bedtime both eyes     Bhavesh Kim MD  [phone]

## 2021-07-23 NOTE — LETTER
7/23/2021         RE: Cornel Monte  [address] [facility] [address]        Dear Colleague,    Thank you for referring your patient, Cornel Monte, to the [facility]. Please see a copy of my visit note below.     Current Eye Medications:    Latanoprost at bedtime both eyes, did not take drops last night, took the night before     Subjective: here for HVF/OCT and IOP today for glaucoma. Eyes have been stable for a year now.  Ever since the avastin he has sometimes little twinges of pain in the left eye, but retina said that everything is fine and stable.     Objective:  See Ophthalmology Exam.       Assessment:  Cornel Monte is a 50 year old male who presents with:   Encounter Diagnoses   Name Primary?     Borderline glaucoma of both eyes with ocular hypertension Intraocular pressure 24/24 (did not use his latanoprost drop last night). Stable OCT and visual field both eyes.     OCT optic nerve: avg retinal nerve fiber layer 93/97, within normal limits and stable both eyes.     Davidson visual field (HVF) 24-2: within normal limits both eyes.      Branch retinal vein occlusion of left eye with macular edema- sees [facility] Dr Marisa Chavez        Plan:  Continue Latanoprost (green top) at bedtime both eyes     Bhavesh Kim MD  [phone]          Again, thank you for allowing me to participate in the care of your patient.        Sincerely,        Bhavesh Kim MD

## 2021-07-28 ENCOUNTER — NURSE TRIAGE (OUTPATIENT)
Dept: FAMILY MEDICINE | Facility: CLINIC | Age: 51
End: 2021-07-28

## 2021-07-28 NOTE — TELEPHONE ENCOUNTER
Patient called the clinic.  He reports developing pain discomfort to his left lower abdominal and groin area.  He describes the pain as a tightness or pressure than intensifies with stretching.  Patient reports the pain is there constantly, rates pain as 4 or 5/10.  Last bowel movement was this morning X 1. He usually has 2-3 bowel movements a day.  Patient denies any other symptoms or concerns at this time.      Patient was advised to scheduled an appointment for further evaluation and treatment.  Patient wanted to schedule an appointment with PCP. No appointment available until next week.  Assisted with scheduling an appointment for 7/29/21.  Patient was advised to call the clinic [phone] or seek medical assistance if the symptoms worsen before scheduled appointment.    Patient verbalized understanding and has no further questions or concerns at this time.        Reason for Disposition    Patient wants to be seen    Additional Information    Negative: Passed out (i.e., fainted, collapsed and was not responding)    Negative: Shock suspected (e.g., cold/pale/clammy skin, too weak to stand, low BP, rapid pulse)    Negative: Sounds like a life-threatening emergency to the triager    Negative: Chest pain    Negative: Pain is mainly in upper abdomen (if needed ask: 'is it mainly above the belly button?')    Negative: SEVERE abdominal pain (e.g., excruciating)    Negative: Vomiting red blood or black (coffee ground) material    Negative: Bloody, black, or tarry bowel movements (Exception: chronic-unchanged black-grey bowel movements and is taking iron pills or Pepto-bismol)    Negative: Unable to urinate (or only a few drops) and bladder feels very full    Negative: Pain in scrotum persists > 1 hour    Negative: Constant abdominal pain lasting > 2 hours    Negative: Vomiting bile (green color)    Negative: Patient sounds very sick or weak to the triager    Negative: Vomiting and abdomen looks much more swollen than  usual    Negative: White of the eyes have turned yellow (i.e., jaundice)    Negative: Blood in urine (red, pink, or tea-colored)    Negative: Fever > 103 F (39.4 C)    Negative: Fever > 101 F (38.3 C) and over 60 years of age    Negative: Fever > 100.0 F (37.8 C) and has diabetes mellitus or a weak immune system (e.g., HIV positive, cancer chemotherapy, organ transplant, splenectomy, chronic steroids)    Negative: Fever > 100.0 F (37.8 C) and bedridden (e.g., nursing home patient, stroke, chronic illness, recovering from surgery)    Negative: MILD pain that comes and goes (cramps) lasts > 24 hours    Negative: Age > 60 years    Protocols used: ABDOMINAL PAIN - MALE-A-OH

## 2021-07-29 ENCOUNTER — OFFICE VISIT (OUTPATIENT)
Dept: FAMILY MEDICINE | Facility: CLINIC | Age: 51
End: 2021-07-29
Payer: COMMERCIAL

## 2021-07-29 VITALS
WEIGHT: 182.8 LBS | DIASTOLIC BLOOD PRESSURE: 86 MMHG | HEART RATE: 60 BPM | BODY MASS INDEX: 29.5 KG/M2 | SYSTOLIC BLOOD PRESSURE: 130 MMHG | OXYGEN SATURATION: 100 % | TEMPERATURE: 97.7 F

## 2021-07-29 DIAGNOSIS — R10.32 LLQ ABDOMINAL PAIN: Primary | ICD-10-CM

## 2021-07-29 DIAGNOSIS — Z12.11 SCREENING FOR COLON CANCER: ICD-10-CM

## 2021-07-29 PROCEDURE — 99214 OFFICE O/P EST MOD 30 MIN: CPT | Performed by: FAMILY MEDICINE

## 2021-07-29 NOTE — PROGRESS NOTES
Assessment & Plan       ICD-10-CM    1. LLQ abdominal pain  R10.32 CT Abdomen w/o Contrast   2. Screening for colon cancer  Z12.11 Adult Gastro Ref - Procedure Only     Home exercises given for what's likely to be a lower abdominal strain  Schedule CT scan if no improvement in 1 week      Review of external notes as documented elsewhere in note         Patient Instructions   Clegraeme    It was a pleasure seeing you in clinic today.  Here's the plan:    1. Abdominal pain - left lower quadrant of the abdomen.  Likely muscle strain given that pain is worse in certain positions.  Home exercises.  If no improvement within the next week, set up CT scan of abdomen (ordered already).  In addition to this, you should set up your colonoscopy for colon cancer screening.    Let me know if you have questions.    Dejon Conrad MD        Return in about 1 week (around 8/5/2021) for If symptoms persist.    Dejon Conrad MD  [facility] IZABEL Unger is a 50 year old who presents for the following health issues     HPI     Concern - Abdominal pain/possible pulled muscle  Onset: Over 1 week  Description: Lower left quadrant   Intensity: moderate  Progression of Symptoms:  worsening  Accompanying Signs & Symptoms: turning, lifting or stretching hurt  Previous history of similar problem:   Precipitating factors:        Worsened by: Certain movements  Alleviating factors:        Improved by:   Therapies tried and outcome:  none     Patient works in maintenance dept  Left lower abdominal pain  Positional, worse when leaning back/left  X 1 week  No radiation to groin/scrotum  No inciting event  Normal stool        Review of Systems   Constitutional, HEENT, cardiovascular, pulmonary, gi and gu systems are negative, except as otherwise noted.      Objective    /86   Pulse 60   Temp 97.7  F (36.5  C) (Oral)   Wt 82.9 kg (182 lb 12.8 oz)   SpO2 100%   BMI 29.50 kg/m    Body mass index is  29.5 kg/m .  Physical Exam   GENERAL: healthy, alert and no distress  EYES: Eyes grossly normal to inspection, PERRL and conjunctivae and sclerae normal  NECK: no adenopathy, no asymmetry, masses, or scars and thyroid normal to palpation  ABDOMEN: soft, (+)LLQ tenderness, no hepatosplenomegaly, no masses and bowel sounds normal  SKIN: no suspicious lesions or rashes  PSYCH: mentation appears normal, affect normal/bright

## 2021-08-13 ENCOUNTER — HOSPITAL ENCOUNTER (OUTPATIENT)
Facility: AMBULATORY SURGERY CENTER | Age: 51
End: 2021-08-13
Attending: SURGERY | Admitting: SURGERY
Payer: MEDICAID

## 2021-08-13 ENCOUNTER — TELEPHONE (OUTPATIENT)
Dept: GASTROENTEROLOGY | Facility: OUTPATIENT CENTER | Age: 51
End: 2021-08-13

## 2021-08-13 NOTE — TELEPHONE ENCOUNTER
Screening Questions  1. Are you active on mychart?Y    2. What insurance is in the chart?     2.  Ordering/Referring Provider:     3. BMI : 5'6/180=29.0    4. Are you on daily home oxygen? N    5. Do you have a history of difficult airway? N    6. Have you had a heart, lung, or liver transplant? N    7. Are you currently on dialysis? N    8. Have you had a stroke or Transient ischemic atttack (TIA) within 6 months? N    9. In the past 6 months, have you had any heart related issues including cardiomyopathy or heart attack?         If yes, did it require cardiac stenting or other implantable device?N    10. Do you have any implantable devices in your body (pacemaker, defib, LVAD)? N    11. Do you take nitroglycerin? If yes, how often? N    12. Are you currently taking any blood thinners?N    13. Are you a diabetic? N    14. (Females) Are you currently pregnant? N/A  If yes, how many weeks?    15. Have you had a procedure in the past that was difficult to tolerate with conscious sedation? Any allergies to Fentanyl or Versed N    16. Are you taking any scheduled prescription narcotics more than once daily? N    17. Do you have any chemical dependencies such as alcohol, street drugs, or methadone? N    18. Do you have any history of post-traumatic stress syndrome or mental health issues? N    19. Do you transfer independently? Y    20.  Do you have any issues with constipation?     21. Preferred Pharmacy for Pre Prescription     Scheduling Details    Which Colonoscopy Prep was Sent?: MIRALAX  Procedure Scheduled: COLON-CS  Provider/Surgeon: dary  Date of Procedure: 9/3  Location:   Caller (Please ask for phone number if not scheduled by patient): PATIENT      Sedation Type: CS  Conscious Sedation- Needs  for 6 hours after the procedure  MAC/General-Needs  for 24 hours after procedure    Pre-op Required at [facility], [place], [place] and OR for MAC sedation:   (if yes advise patient they will need  a pre-op prior to procedure)      Is patient on blood thinners? -N (If yes- inform patient to follow up with PCP or provider for follow up instructions)     Informed patient they will need an adult  Y  Cannot take any type of public or medical transportation alone    Informed Patient of COVID Test Requirement Y    Confirmed Nurse will call to complete assessment Y    Additional comments:

## 2021-08-16 DIAGNOSIS — Z11.59 ENCOUNTER FOR SCREENING FOR OTHER VIRAL DISEASES: ICD-10-CM

## 2021-09-01 NOTE — MR AVS SNAPSHOT
After Visit Summary   9/17/2018    Cornel Monte    MRN: [id number]           Patient Information     Date Of Birth          1970        Visit Information        Provider Department      9/17/2018 6:00 PM Cookie Suero OD [facility]        Today's Diagnoses     Presbyopia    -  1    Regular astigmatism of both eyes          Care Instructions    Patient was advised of today's exam findings.  Fill reading glasses prescription  Allow 2 weeks to adapt to glasses  Return in 1 year for eye exam    Cookie Suero O.D.  [facility]   [address] [place] [address]  [phone]            Follow-ups after your visit        Who to contact     If you have questions or need follow up information about today's clinic visit or your schedule please contact [facility] directly at [phone].  Normal or non-critical lab and imaging results will be communicated to you by MyChart, letter or phone within 4 business days after the clinic has received the results. If you do not hear from us within 7 days, please contact the clinic through MyChart or phone. If you have a critical or abnormal lab result, we will notify you by phone as soon as possible.  Submit refill requests through Decide[employer] or call your pharmacy and they will forward the refill request to us. Please allow 3 business days for your refill to be completed.          Additional Information About Your Visit        Care EveryWhere ID     This is your Care EveryWhere ID. This could be used by other organizations to access your [place] medical records  [id number]         Blood Pressure from Last 3 Encounters:   09/10/18 126/68   09/06/17 132/82   09/02/16 126/78    Weight from Last 3 Encounters:   09/10/18 82.6 kg (182 lb)   09/06/17 83.5 kg (184 lb)   09/02/16 83.5 kg (184 lb)              Today, you had the following     No orders found for display       Primary Care Provider Office Phone #  Fax #    Nathan Carson [phone]       [address] [place] [id number]        Equal Access to Services     LESLY WHEELER : Hadii aad ku hadshamarmoni Tiffanywilliam, nbaregulo lakhaniclaudetteha, selam kadavidda brenda, kiel read aguilaamanda dinero josé manuel martinez. So [facility] [phone].    ATENCIÓN: Si habla español, tiene a garcia disposición servicios gratuitos de asistencia lingüística. Llame al [phone].    We comply with applicable federal civil rights laws and [place] laws. We do not discriminate on the basis of race, color, national origin, age, disability, sex, sexual orientation, or gender identity.            Thank you!     Thank you for choosing [facility]  [facility]. Our goal is always to provide you with excellent care. Hearing back from our patients is one way we can continue to improve our services. Please take a few minutes to complete the written survey that you may receive in the mail after your visit with us. Thank you!             Your Updated Medication List - Protect others around you: Learn how to safely use, store and throw away your medicines at www.disposemymeds.org.          This list is accurate as of 9/17/18  7:15 PM.  Always use your most recent med list.                   Brand Name Dispense Instructions for use Diagnosis    acetaminophen 500 MG tablet    TYLENOL    100 tablet    Take 2 tablets (1,000 mg) by mouth every 6 hours as needed for mild pain    Right ankle pain          no

## 2021-09-26 ENCOUNTER — HEALTH MAINTENANCE LETTER (OUTPATIENT)
Age: 51
End: 2021-09-26

## 2021-11-22 DIAGNOSIS — I10 HTN, GOAL BELOW 140/90: ICD-10-CM

## 2021-11-24 RX ORDER — LISINOPRIL 10 MG/1
10 TABLET ORAL DAILY
Qty: 90 TABLET | Refills: 2 | Status: SHIPPED | OUTPATIENT
Start: 2021-11-24 | End: 2021-12-10

## 2021-11-24 NOTE — TELEPHONE ENCOUNTER
Routing refill request to provider for review/approval because:  Labs not current:  creatinine and potassium    Creatinine   Date Value Ref Range Status   10/16/2020 1.17 0.66 - 1.25 mg/dL Final      Potassium   Date Value Ref Range Status   10/16/2020 4.3 3.4 - 5.3 mmol/L Final              Pending Prescriptions:                       Disp   Refills    lisinopril (ZESTRIL) 10 MG tablet [Pharmac*90 tab*2        Sig: Take 1 tablet (10 mg) by mouth daily        Albert Rust RN

## 2021-12-10 ENCOUNTER — OFFICE VISIT (OUTPATIENT)
Dept: FAMILY MEDICINE | Facility: CLINIC | Age: 51
End: 2021-12-10
Payer: COMMERCIAL

## 2021-12-10 VITALS
SYSTOLIC BLOOD PRESSURE: 139 MMHG | OXYGEN SATURATION: 100 % | WEIGHT: 185 LBS | BODY MASS INDEX: 29.86 KG/M2 | HEART RATE: 66 BPM | DIASTOLIC BLOOD PRESSURE: 82 MMHG

## 2021-12-10 DIAGNOSIS — Z12.11 SCREENING FOR COLON CANCER: ICD-10-CM

## 2021-12-10 DIAGNOSIS — Z12.11 SCREEN FOR COLON CANCER: ICD-10-CM

## 2021-12-10 DIAGNOSIS — E78.5 HYPERLIPIDEMIA LDL GOAL <100: ICD-10-CM

## 2021-12-10 DIAGNOSIS — I10 HTN, GOAL BELOW 140/90: ICD-10-CM

## 2021-12-10 DIAGNOSIS — Z00.00 ROUTINE GENERAL MEDICAL EXAMINATION AT A HEALTH CARE FACILITY: Primary | ICD-10-CM

## 2021-12-10 DIAGNOSIS — R00.2 PALPITATIONS: ICD-10-CM

## 2021-12-10 DIAGNOSIS — Z11.59 NEED FOR HEPATITIS C SCREENING TEST: ICD-10-CM

## 2021-12-10 LAB
ANION GAP SERPL CALCULATED.3IONS-SCNC: 3 MMOL/L (ref 3–14)
BUN SERPL-MCNC: 19 MG/DL (ref 7–30)
CALCIUM SERPL-MCNC: 9.2 MG/DL (ref 8.5–10.1)
CHLORIDE BLD-SCNC: 103 MMOL/L (ref 94–109)
CHOLEST SERPL-MCNC: 218 MG/DL
CO2 SERPL-SCNC: 30 MMOL/L (ref 20–32)
CREAT SERPL-MCNC: 1.19 MG/DL (ref 0.66–1.25)
FASTING STATUS PATIENT QL REPORTED: YES
GFR SERPL CREATININE-BSD FRML MDRD: 70 ML/MIN/1.73M2
GLUCOSE BLD-MCNC: 96 MG/DL (ref 70–99)
HCV AB SERPL QL IA: NONREACTIVE
HDLC SERPL-MCNC: 50 MG/DL
LDLC SERPL CALC-MCNC: 155 MG/DL
NONHDLC SERPL-MCNC: 168 MG/DL
POTASSIUM BLD-SCNC: 4.3 MMOL/L (ref 3.4–5.3)
SODIUM SERPL-SCNC: 136 MMOL/L (ref 133–144)
TRIGL SERPL-MCNC: 66 MG/DL

## 2021-12-10 PROCEDURE — 90471 IMMUNIZATION ADMIN: CPT | Performed by: INTERNAL MEDICINE

## 2021-12-10 PROCEDURE — 80048 BASIC METABOLIC PNL TOTAL CA: CPT | Performed by: INTERNAL MEDICINE

## 2021-12-10 PROCEDURE — 80061 LIPID PANEL: CPT | Performed by: INTERNAL MEDICINE

## 2021-12-10 PROCEDURE — 36415 COLL VENOUS BLD VENIPUNCTURE: CPT | Performed by: INTERNAL MEDICINE

## 2021-12-10 PROCEDURE — 90714 TD VACC NO PRESV 7 YRS+ IM: CPT | Performed by: INTERNAL MEDICINE

## 2021-12-10 PROCEDURE — 99396 PREV VISIT EST AGE 40-64: CPT | Mod: 25 | Performed by: INTERNAL MEDICINE

## 2021-12-10 PROCEDURE — 86803 HEPATITIS C AB TEST: CPT | Performed by: INTERNAL MEDICINE

## 2021-12-10 RX ORDER — LISINOPRIL 10 MG/1
10 TABLET ORAL DAILY
Qty: 90 TABLET | Refills: 4 | Status: SHIPPED | OUTPATIENT
Start: 2021-12-10 | End: 2022-12-19

## 2021-12-10 RX ORDER — METOPROLOL SUCCINATE 50 MG/1
50 TABLET, EXTENDED RELEASE ORAL DAILY
Qty: 90 TABLET | Refills: 4 | Status: SHIPPED | OUTPATIENT
Start: 2021-12-10 | End: 2022-12-19

## 2021-12-10 ASSESSMENT — ENCOUNTER SYMPTOMS
JOINT SWELLING: 0
PALPITATIONS: 0
EYE PAIN: 0
DIZZINESS: 0
SORE THROAT: 0
NAUSEA: 0
WEAKNESS: 0
HEARTBURN: 0
CONSTIPATION: 0
ARTHRALGIAS: 0
HEMATURIA: 0
SHORTNESS OF BREATH: 0
COUGH: 0
MYALGIAS: 0
FEVER: 0
ABDOMINAL PAIN: 1
DIARRHEA: 0
HEMATOCHEZIA: 0
HEADACHES: 0
DYSURIA: 0
CHILLS: 0
PARESTHESIAS: 0
NERVOUS/ANXIOUS: 0
FREQUENCY: 0

## 2021-12-10 NOTE — PROGRESS NOTES
SUBJECTIVE:   CC: Cornel Monte is an 51 year old male who presents for preventative health visit.     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  3-4 months ago pain in the left flank   Confused belt in the area or sicknesss.  Then the pain went away completely   Move the certain way   Changed way eating does not like to eat after 6   Not in the morning   No diarrhea or constipated possible rash in the area          Today's PHQ-2 Score:   PHQ-2 ( 1999 Pfizer) 12/10/2021   Q1: Little interest or pleasure in doing things 0   Q2: Feeling down, depressed or hopeless 0   PHQ-2 Score 0   PHQ-2 Total Score (12-17 Years)- Positive if 3 or more points; Administer PHQ-A if positive -   Q1: Little interest or pleasure in doing things Not at all   Q2: Feeling down, depressed or hopeless Not at all   PHQ-2 Score 0       Abuse: Current or Past(Physical, Sexual or Emotional)- No  Do you feel safe in your environment? Yes    Have you ever done Advance Care Planning? (For example, a Health Directive, POLST, or a discussion with a medical provider or your loved ones about your wishes): No, advance care planning information given to patient to review.  Patient plans to discuss their wishes with loved ones or provider.      Social History     Tobacco Use     Smoking status: Never Smoker     Smokeless tobacco: Never Used     Tobacco comment: smoke free household.   Substance Use Topics     Alcohol use: No     If you  drink alcohol do you typically have >3 drinks per day or >7 drinks per week? No    Alcohol Use 12/10/2021   Prescreen: >3 drinks/day or >7 drinks/week? No   Prescreen: >3 drinks/day or >7 drinks/week? -   No flowsheet data found.    Last PSA:   PSA   Date Value Ref Range Status   04/27/2009 0.82 0 - 4 ug/L Final       Reviewed orders with patient. Reviewed health maintenance and updated orders accordingly - Yes  Lab work is in process  Labs reviewed in EPIC  BP Readings from Last 3 Encounters:   12/10/21 139/82   07/29/21 130/86   04/15/21 120/60    Wt Readings from Last 3 Encounters:   12/10/21 83.9 kg (185 lb)   07/29/21 82.9 kg (182 lb 12.8 oz)   04/15/21 81.6 kg (180 lb)                  Patient Active Problem List   Diagnosis     CARDIOVASCULAR SCREENING; LDL GOAL LESS THAN 160     Preglaucoma     Vitamin D deficiency disease     Cup to disc asymmetry, bilateral     Branch retinal vein occlusion of left eye with macular edema- sees VRS Dr Marisa Chavez     Past Surgical History:   Procedure Laterality Date     AVASTIN (BEVACIZUMAB) 1.25 MG INTRAVITREAL INJECTION OS (LEFT EYE) Left     4-15-19 Dr Marisa Chavez, [facility]       Social History     Tobacco Use     Smoking status: Never Smoker     Smokeless tobacco: Never Used     Tobacco comment: smoke free household.   Substance Use Topics     Alcohol use: No     Family History   Problem Relation Age of Onset     Hypertension Father      Family History Negative Other      Cancer No family hx of      Diabetes No family hx of      Cerebrovascular Disease No family hx of      Thyroid Disease No family hx of      Glaucoma No family hx of      Macular Degeneration No family hx of          Current Outpatient Medications   Medication Sig Dispense Refill     latanoprost (XALATAN) 0.005 % ophthalmic solution Place 1 drop into both eyes At Bedtime 2.5 mL 11     lisinopril (ZESTRIL) 10 MG tablet Take 1 tablet (10 mg) by mouth daily 90 tablet 4     metoprolol succinate ER (TOPROL-XL)  50 MG 24 hr tablet Take 1 tablet (50 mg) by mouth daily To replace short 90 tablet 4     No Known Allergies    Reviewed and updated as needed this visit by clinical staff  Tobacco  Allergies  Meds             Reviewed and updated as needed this visit by Provider                   Review of Systems   Constitutional: Negative for chills and fever.   HENT: Negative for congestion, ear pain, hearing loss and sore throat.    Eyes: Negative for pain and visual disturbance.   Respiratory: Negative for cough and shortness of breath.    Cardiovascular: Negative for chest pain, palpitations and peripheral edema.   Gastrointestinal: Positive for abdominal pain. Negative for constipation, diarrhea, heartburn, hematochezia and nausea.   Genitourinary: Negative for dysuria, frequency, genital sores, hematuria, impotence, penile discharge and urgency.   Musculoskeletal: Negative for arthralgias, joint swelling and myalgias.   Skin: Negative for rash.   Neurological: Negative for dizziness, weakness, headaches and paresthesias.   Psychiatric/Behavioral: Negative for mood changes. The patient is not nervous/anxious.      CONSTITUTIONAL: NEGATIVE for fever, chills, change in weight  INTEGUMENTARY/SKIN: NEGATIVE for worrisome rashes, moles or lesions  EYES: NEGATIVE for vision changes or irritation  ENT: NEGATIVE for ear, mouth and throat problems  RESP: NEGATIVE for significant cough or SOB  CV: NEGATIVE for chest pain, palpitations or peripheral edema  GI: NEGATIVE for nausea, abdominal pain, heartburn, or change in bowel habits   male: negative for dysuria, hematuria, decreased urinary stream, erectile dysfunction, urethral discharge  MUSCULOSKELETAL: NEGATIVE for significant arthralgias or myalgia  NEURO: NEGATIVE for weakness, dizziness or paresthesias  PSYCHIATRIC: NEGATIVE for changes in mood or affect    OBJECTIVE:   /82 (BP Location: Right arm, Patient Position: Chair, Cuff Size: Adult Regular)   Pulse 66   Wt  83.9 kg (185 lb)   SpO2 100%   BMI 29.86 kg/m      Physical Exam  GENERAL: healthy, alert and no distress  EYES: Eyes grossly normal to inspection, PERRL and conjunctivae and sclerae normal  HENT: ear canals and TM's normal, nose and mouth without ulcers or lesions  NECK: no adenopathy, no asymmetry, masses, or scars and thyroid normal to palpation  RESP: lungs clear to auscultation - no rales, rhonchi or wheezes  CV: regular rate and rhythm, normal S1 S2, no S3 or S4, no murmur, click or rub, no peripheral edema and peripheral pulses strong  ABDOMEN: soft, nontender, no hepatosplenomegaly, no masses and bowel sounds normal  MS: no gross musculoskeletal defects noted, no edema  SKIN: no suspicious lesions or rashes  NEURO: Normal strength and tone, mentation intact and speech normal  BACK: no CVA tenderness, no paralumbar tenderness  PSYCH: mentation appears normal, affect normal/bright  LYMPH: no cervical, supraclavicular, axillary, or inguinal adenopathy    Diagnostic Test Results:  Labs reviewed in Epic  Results for orders placed or performed in visit on 12/10/21   Lipid panel reflex to direct LDL Fasting     Status: Abnormal   Result Value Ref Range    Cholesterol 218 (H) <200 mg/dL    Triglycerides 66 <150 mg/dL    Direct Measure HDL 50 >=40 mg/dL    LDL Cholesterol Calculated 155 (H) <=100 mg/dL    Non HDL Cholesterol 168 (H) <130 mg/dL    Patient Fasting > 8hrs? Yes     Narrative    Cholesterol  Desirable:  <200 mg/dL    Triglycerides  Normal:  Less than 150 mg/dL  Borderline High:  150-199 mg/dL  High:  200-499 mg/dL  Very High:  Greater than or equal to 500 mg/dL    Direct Measure HDL  Female:  Greater than or equal to 50 mg/dL   Male:  Greater than or equal to 40 mg/dL    LDL Cholesterol  Desirable:  <100mg/dL  Above Desirable:  100-129 mg/dL   Borderline High:  130-159 mg/dL   High:  160-189 mg/dL   Very High:  >= 190 mg/dL    Non HDL Cholesterol  Desirable:  130 mg/dL  Above Desirable:  130-159  mg/dL  Borderline High:  160-189 mg/dL  High:  190-219 mg/dL  Very High:  Greater than or equal to 220 mg/dL   Basic metabolic panel  (Ca, Cl, CO2, Creat, Gluc, K, Na, BUN)     Status: Normal   Result Value Ref Range    Sodium 136 133 - 144 mmol/L    Potassium 4.3 3.4 - 5.3 mmol/L    Chloride 103 94 - 109 mmol/L    Carbon Dioxide (CO2) 30 20 - 32 mmol/L    Anion Gap 3 3 - 14 mmol/L    Urea Nitrogen 19 7 - 30 mg/dL    Creatinine 1.19 0.66 - 1.25 mg/dL    Calcium 9.2 8.5 - 10.1 mg/dL    Glucose 96 70 - 99 mg/dL    GFR Estimate 70 >60 mL/min/1.73m2   Hepatitis C Screen Reflex to HCV RNA Quant and Genotype     Status: Normal   Result Value Ref Range    Hepatitis C Antibody Nonreactive Nonreactive    Narrative    Assay performance characteristics have not been established for newborns, infants, and children.       ASSESSMENT/PLAN:   Cornel was seen today for physical.    Diagnoses and all orders for this visit:    Routine general medical examination at a health care facility  -     REVIEW OF HEALTH MAINTENANCE PROTOCOL ORDERS  -     Basic metabolic panel  (Ca, Cl, CO2, Creat, Gluc, K, Na, BUN); Future  -     Basic metabolic panel  (Ca, Cl, CO2, Creat, Gluc, K, Na, BUN)    Screen for colon cancer  -     Adult Gastro Ref - Procedure Only; Future    Screening for colon cancer    Need for hepatitis C screening test  -     Hepatitis C Screen Reflex to HCV RNA Quant and Genotype; Future  -     Hepatitis C Screen Reflex to HCV RNA Quant and Genotype    Hyperlipidemia LDL goal <100  -     Lipid panel reflex to direct LDL Fasting; Future  -     Lipid panel reflex to direct LDL Fasting    HTN, goal below 140/90  -     lisinopril (ZESTRIL) 10 MG tablet; Take 1 tablet (10 mg) by mouth daily    Palpitations  -     metoprolol succinate ER (TOPROL-XL) 50 MG 24 hr tablet; Take 1 tablet (50 mg) by mouth daily To replace short    Other orders  -     TD PRESERV FREE, IM (7+ YRS) (DECAVAC/TENIVAC)        Patient has been advised of split  "billing requirements and indicates understanding: Yes  COUNSELING:   Reviewed preventive health counseling, as reflected in patient instructions       Regular exercise       Healthy diet/nutrition       Vision screening       Hearing screening       Colon cancer screening    Estimated body mass index is 29.86 kg/m  as calculated from the following:    Height as of 4/15/21: 1.676 m (5' 6\").    Weight as of this encounter: 83.9 kg (185 lb).     Weight management plan: Discussed healthy diet and exercise guidelines    He reports that he has never smoked. He has never used smokeless tobacco.      Counseling Resources:  ATP IV Guidelines  Pooled Cohorts Equation Calculator  FRAX Risk Assessment  ICSI Preventive Guidelines  Dietary Guidelines for Americans, 2010  USDA's MyPlate  ASA Prophylaxis  Lung CA Screening    Nathan Carson MD  [facility]  "

## 2022-01-24 ENCOUNTER — MYC MEDICAL ADVICE (OUTPATIENT)
Dept: FAMILY MEDICINE | Facility: CLINIC | Age: 52
End: 2022-01-24
Payer: COMMERCIAL

## 2022-01-24 ENCOUNTER — TELEPHONE (OUTPATIENT)
Dept: FAMILY MEDICINE | Facility: CLINIC | Age: 52
End: 2022-01-24
Payer: COMMERCIAL

## 2022-01-24 NOTE — LETTER
Cornel Monte  1970     Cornel is experiencing symptoms related to recent COVID infection and can return to work on 1/31/2022 without restriction.            SUZANNE KEARNEY MD        January 24, 2022

## 2022-01-24 NOTE — TELEPHONE ENCOUNTER
Home Care:  Use cough drops, drink extra liquids, use a humidifier  For fever over 101 take acetaminophen every 4-6 hours OR ibuprofen every 6-8 hours. Do not exceed maximum dosage on the package. These medicines will also help with muscle aches, headache and other pains.     Patient reports his work only requires 5 day quarantine and expect him back at work on 1/26/22. Advised if he is still not feeling ok to schedule virtual appointment for dr guillaume or send Mychart.     Angela CHAN RN  [facility]

## 2022-01-24 NOTE — TELEPHONE ENCOUNTER
Pt tested positive for Covid-19 on 1/21 at his workplace, patient gets tested weekly.      Only symptom is coughing, the worst is at night and he develops a headache, no appetite.     Wondering what OTC medications he could take to help with the symptoms; vitamins etc..    Pt is worried because he's taking something for BP and doesn't know if he can take other medications    [phone] ok to harish Valero/DOUG Vasquez

## 2022-02-15 ENCOUNTER — MYC MEDICAL ADVICE (OUTPATIENT)
Dept: OPHTHALMOLOGY | Facility: CLINIC | Age: 52
End: 2022-02-15
Payer: COMMERCIAL

## 2022-02-28 ENCOUNTER — TRANSFERRED RECORDS (OUTPATIENT)
Dept: HEALTH INFORMATION MANAGEMENT | Facility: CLINIC | Age: 52
End: 2022-02-28
Payer: COMMERCIAL

## 2022-04-07 ENCOUNTER — OFFICE VISIT (OUTPATIENT)
Dept: OPHTHALMOLOGY | Facility: CLINIC | Age: 52
End: 2022-04-07
Payer: COMMERCIAL

## 2022-04-07 DIAGNOSIS — H52.223 MYOPIA OF BOTH EYES WITH REGULAR ASTIGMATISM AND PRESBYOPIA: ICD-10-CM

## 2022-04-07 DIAGNOSIS — Z01.00 EXAMINATION OF EYES AND VISION: Primary | ICD-10-CM

## 2022-04-07 DIAGNOSIS — H52.4 MYOPIA OF BOTH EYES WITH REGULAR ASTIGMATISM AND PRESBYOPIA: ICD-10-CM

## 2022-04-07 DIAGNOSIS — H52.13 MYOPIA OF BOTH EYES WITH REGULAR ASTIGMATISM AND PRESBYOPIA: ICD-10-CM

## 2022-04-07 DIAGNOSIS — H34.8320 BRANCH RETINAL VEIN OCCLUSION OF LEFT EYE WITH MACULAR EDEMA (H): ICD-10-CM

## 2022-04-07 DIAGNOSIS — H40.053 BORDERLINE GLAUCOMA OF BOTH EYES WITH OCULAR HYPERTENSION: ICD-10-CM

## 2022-04-07 PROCEDURE — 92015 DETERMINE REFRACTIVE STATE: CPT | Performed by: STUDENT IN AN ORGANIZED HEALTH CARE EDUCATION/TRAINING PROGRAM

## 2022-04-07 PROCEDURE — 92014 COMPRE OPH EXAM EST PT 1/>: CPT | Performed by: STUDENT IN AN ORGANIZED HEALTH CARE EDUCATION/TRAINING PROGRAM

## 2022-04-07 ASSESSMENT — SLIT LAMP EXAM - LIDS
COMMENTS: 2+ SCLERAL SHOW INFERIOR
COMMENTS: 2+ SCLERAL SHOW INFERIOR

## 2022-04-07 ASSESSMENT — VISUAL ACUITY
OS_SC: 20/20
OS_SC+: -2
OD_SC: 20/20
METHOD: SNELLEN - LINEAR

## 2022-04-07 ASSESSMENT — REFRACTION_MANIFEST
OS_SPHERE: -1.00
OS_ADD: +2.25
OS_AXIS: 015
OS_CYLINDER: +1.25
OD_CYLINDER: +0.75
OD_SPHERE: -0.50
OD_AXIS: 180
OD_ADD: +2.25

## 2022-04-07 ASSESSMENT — TONOMETRY
OS_IOP_MMHG: 21
OD_IOP_MMHG: 21
IOP_METHOD: APPLANATION

## 2022-04-07 ASSESSMENT — CUP TO DISC RATIO
OS_RATIO: 0.5
OD_RATIO: 0.6

## 2022-04-07 ASSESSMENT — CONF VISUAL FIELD
OD_NORMAL: 1
METHOD: COUNTING FINGERS
OS_NORMAL: 1

## 2022-04-07 ASSESSMENT — EXTERNAL EXAM - LEFT EYE: OS_EXAM: NORMAL

## 2022-04-07 ASSESSMENT — EXTERNAL EXAM - RIGHT EYE: OD_EXAM: NORMAL

## 2022-04-07 NOTE — PATIENT INSTRUCTIONS
Continue Latanoprost (green top) at bedtime both eyes     Continue over the counter readers as needed     Bhavesh Kim MD  [phone]

## 2022-04-07 NOTE — LETTER
4/7/2022         RE: Cornel Monte  [address] [facility] [address]        Dear Colleague,    Thank you for referring your patient, Cornel Monte, to the [facility]. Please see a copy of my visit note below.     Current Eye Medications:  Latanoprost at bedtime both eyes, last took at 10 pm.     Subjective:  Here for complete/annual eye exam. Vision is OK both eyes in the distance. Can read for about 1 hour with OTC readers, then vision becomes blurry. No eye pain or discomfort in either eye.      Objective:  See Ophthalmology Exam.       Assessment:  Cornel Monte is a 51 year old male who presents with:   Encounter Diagnoses   Name Primary?     Examination of eyes and vision Yes     Borderline glaucoma of both eyes with ocular hypertension      Branch retinal vein occlusion of left eye with macular edema- sees VRS Dr Marisa Chavez      Myopia of both eyes with regular astigmatism and presbyopia      Stable eye exam.      Plan:  Continue Latanoprost (green top) at bedtime both eyes     Continue over the counter readers as needed     Bhavesh Kim MD  [phone]          Again, thank you for allowing me to participate in the care of your patient.        Sincerely,        Bhavesh Kim MD

## 2022-04-07 NOTE — PROGRESS NOTES
Current Eye Medications:  Latanoprost at bedtime both eyes, last took at 10 pm.     Subjective:  Here for complete/annual eye exam. Vision is OK both eyes in the distance. Can read for about 1 hour with OTC readers, then vision becomes blurry. No eye pain or discomfort in either eye.      Objective:  See Ophthalmology Exam.       Assessment:  Cornel Monte is a 51 year old male who presents with:   Encounter Diagnoses   Name Primary?     Examination of eyes and vision Yes     Borderline glaucoma of both eyes with ocular hypertension      Branch retinal vein occlusion of left eye with macular edema- sees VRS Dr Marisa Chavez      Myopia of both eyes with regular astigmatism and presbyopia      Stable eye exam.      Plan:  Continue Latanoprost (green top) at bedtime both eyes     Continue over the counter readers as needed     Bhavesh Kim MD  [phone]

## 2022-05-27 ENCOUNTER — TRANSFERRED RECORDS (OUTPATIENT)
Dept: HEALTH INFORMATION MANAGEMENT | Facility: CLINIC | Age: 52
End: 2022-05-27

## 2022-06-01 ENCOUNTER — MYC MEDICAL ADVICE (OUTPATIENT)
Dept: FAMILY MEDICINE | Facility: CLINIC | Age: 52
End: 2022-06-01
Payer: COMMERCIAL

## 2022-06-06 ENCOUNTER — OFFICE VISIT (OUTPATIENT)
Dept: FAMILY MEDICINE | Facility: CLINIC | Age: 52
End: 2022-06-06
Payer: COMMERCIAL

## 2022-06-06 VITALS
OXYGEN SATURATION: 100 % | TEMPERATURE: 98.7 F | BODY MASS INDEX: 30.18 KG/M2 | SYSTOLIC BLOOD PRESSURE: 132 MMHG | DIASTOLIC BLOOD PRESSURE: 82 MMHG | HEART RATE: 58 BPM | WEIGHT: 187 LBS

## 2022-06-06 DIAGNOSIS — M54.12 CERVICAL NEURALGIA: ICD-10-CM

## 2022-06-06 DIAGNOSIS — I10 HTN, GOAL BELOW 140/90: ICD-10-CM

## 2022-06-06 PROCEDURE — 99214 OFFICE O/P EST MOD 30 MIN: CPT | Performed by: INTERNAL MEDICINE

## 2022-06-06 ASSESSMENT — ENCOUNTER SYMPTOMS: HEADACHES: 1

## 2022-06-06 NOTE — PROGRESS NOTES
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    Nathan Carson MD  [facility] IZABEL Unger is a 51 year old who presents for the following health issues  accompanied by his self.    Headache   Episode onset: See MyChart from 06/02/2022.            Review of Systems   Neurological: Positive for headaches.        Headaches related to the headphones   Ears and all the back   Tylenol 500 mg one in the moeningf   Goes to 4-1   0.5 heavy not back to normal   Listening to music     Constitutional, HEENT, cardiovascular, pulmonary, gi and gu systems are negative, except as otherwise noted.      Objective    /82   Pulse 58   Temp 98.7  F (37.1  C) (Oral)   Wt 84.8 kg (187 lb)   SpO2 100%   BMI 30.18 kg/m    Body mass index is 30.18 kg/m .  Physical Exam   GENERAL: healthy, alert and no distress  EYES: Eyes grossly normal to inspection, PERRL and conjunctivae and sclerae normal  HENT: ear canals and TM's normal, nose and mouth without ulcers or lesions  NECK: no adenopathy, no asymmetry, masses, or scars and thyroid normal to palpation  RESP: lungs clear to auscultation - no rales, rhonchi or wheezes  CV: regular rate and rhythm, normal S1 S2, no S3 or S4, no murmur, click or rub, no peripheral edema and peripheral pulses strong  ABDOMEN: soft, nontender, no hepatosplenomegaly, no masses and bowel sounds normal  MS: no gross musculoskeletal defects noted, no edema  SKIN: no suspicious lesions or rashes  NEURO: Normal strength and tone, mentation intact and speech normal  BACK: no CVA tenderness, no  paralumbar tenderness  PSYCH: mentation appears normal, affect normal/bright  LYMPH: no cervical, supraclavicular, axillary, or inguinal adenopathy    No results found for any visits on 06/06/22.

## 2022-06-07 PROBLEM — I10 HTN, GOAL BELOW 140/90: Status: ACTIVE | Noted: 2022-06-07

## 2022-06-07 PROBLEM — M54.12 CERVICAL NEURALGIA: Status: ACTIVE | Noted: 2022-06-07

## 2022-07-23 DIAGNOSIS — H34.8320 BRANCH RETINAL VEIN OCCLUSION OF LEFT EYE WITH MACULAR EDEMA (H): ICD-10-CM

## 2022-07-23 DIAGNOSIS — H40.053 BORDERLINE GLAUCOMA OF BOTH EYES WITH OCULAR HYPERTENSION: ICD-10-CM

## 2022-07-25 RX ORDER — LATANOPROST 50 UG/ML
1 SOLUTION/ DROPS OPHTHALMIC AT BEDTIME
Qty: 2.5 ML | Refills: 11 | Status: SHIPPED | OUTPATIENT
Start: 2022-07-25 | End: 2022-10-12

## 2022-07-25 NOTE — TELEPHONE ENCOUNTER
Medication was last ordered by Bhavesh Kim MD      Thanks,  Anna RN  [facility]

## 2022-07-25 NOTE — TELEPHONE ENCOUNTER
Received refill request for Latanoprost. Will send to pharmacy for patient, has an upcoming appointment in October.

## 2022-10-06 ENCOUNTER — OFFICE VISIT (OUTPATIENT)
Dept: OPHTHALMOLOGY | Facility: CLINIC | Age: 52
End: 2022-10-06
Payer: MEDICAID

## 2022-10-06 DIAGNOSIS — H34.8320 BRANCH RETINAL VEIN OCCLUSION OF LEFT EYE WITH MACULAR EDEMA (H): ICD-10-CM

## 2022-10-06 DIAGNOSIS — H40.053 BORDERLINE GLAUCOMA OF BOTH EYES WITH OCULAR HYPERTENSION: Primary | ICD-10-CM

## 2022-10-06 PROCEDURE — 92012 INTRM OPH EXAM EST PATIENT: CPT | Performed by: STUDENT IN AN ORGANIZED HEALTH CARE EDUCATION/TRAINING PROGRAM

## 2022-10-06 PROCEDURE — 92133 CPTRZD OPH DX IMG PST SGM ON: CPT | Performed by: STUDENT IN AN ORGANIZED HEALTH CARE EDUCATION/TRAINING PROGRAM

## 2022-10-06 PROCEDURE — 92083 EXTENDED VISUAL FIELD XM: CPT | Performed by: STUDENT IN AN ORGANIZED HEALTH CARE EDUCATION/TRAINING PROGRAM

## 2022-10-06 ASSESSMENT — TONOMETRY
OD_IOP_MMHG: 22
IOP_METHOD: APPLANATION
OS_IOP_MMHG: 21

## 2022-10-06 ASSESSMENT — SLIT LAMP EXAM - LIDS
COMMENTS: 2+ SCLERAL SHOW INFERIOR
COMMENTS: 2+ SCLERAL SHOW INFERIOR

## 2022-10-06 ASSESSMENT — EXTERNAL EXAM - LEFT EYE: OS_EXAM: NORMAL

## 2022-10-06 ASSESSMENT — VISUAL ACUITY
METHOD: SNELLEN - LINEAR
OD_SC+: -2
OS_SC: 20/25
OS_SC+: -2
OD_SC: 20/20

## 2022-10-06 ASSESSMENT — CUP TO DISC RATIO
OS_RATIO: 0.5
OD_RATIO: 0.6

## 2022-10-06 ASSESSMENT — EXTERNAL EXAM - RIGHT EYE: OD_EXAM: NORMAL

## 2022-10-06 NOTE — LETTER
10/6/2022         RE: Cornel Monte  [address] [facility] [address]        Dear Colleague,    Thank you for referring your patient, Cornel Monte, to the [facility]. Please see a copy of my visit note below.     Current Eye Medications:  Latanoprost (green top) at bedtime both eyes       Subjective:  6 month follow up for IOP/HVF/OCT. Vision is doing OK both eyes. Having to hold little farther away when reading. Uses OTC +1.50 readers for bible and that helps. No eye pain or discomfort in either eye.      Objective:  See Ophthalmology Exam.       Assessment:  Cornel Monte is a 51 year old male who presents with:   Encounter Diagnoses   Name Primary?     Borderline glaucoma of both eyes with ocular hypertension Yes    Intraocular pressure 22/21 today. Stable OCT and visual field both eyes. Continue same medication.     OCT optic nerve: avg retinal nerve fiber layer 90/96; within normal limits and stable both eyes.     Davidson visual field (HVF) 24-2: mild scattered loss both eyes.     H/o Branch retinal vein occlusion of left eye with macular edema- sees VRS Dr Marisa Chavez        Plan:  Continue Latanoprost (green top) at bedtime both eyes     Continue over the counter readers as needed     Bhavesh Kim MD  [phone]          Again, thank you for allowing me to participate in the care of your patient.        Sincerely,        Bhavesh Kim MD

## 2022-10-06 NOTE — PATIENT INSTRUCTIONS
Continue Latanoprost (green top) at bedtime both eyes     Continue over the counter readers as needed     Bhavesh Kim MD  [phone]

## 2022-10-06 NOTE — PROGRESS NOTES
Current Eye Medications:  Latanoprost (green top) at bedtime both eyes       Subjective:  6 month follow up for IOP/HVF/OCT. Vision is doing OK both eyes. Having to hold little farther away when reading. Uses OTC +1.50 readers for bible and that helps. No eye pain or discomfort in either eye.      Objective:  See Ophthalmology Exam.       Assessment:  Cornel Monte is a 51 year old male who presents with:   Encounter Diagnoses   Name Primary?     Borderline glaucoma of both eyes with ocular hypertension Yes    Intraocular pressure 22/21 today. Stable OCT and visual field both eyes. Continue same medication.     OCT optic nerve: avg retinal nerve fiber layer 90/96; within normal limits and stable both eyes.     Davidson visual field (HVF) 24-2: mild scattered loss both eyes.     H/o Branch retinal vein occlusion of left eye with macular edema- sees VRS Dr Marisa Chavez        Plan:  Continue Latanoprost (green top) at bedtime both eyes     Continue over the counter readers as needed     Bhavesh Kim MD  [phone]

## 2022-10-12 DIAGNOSIS — H34.8320 BRANCH RETINAL VEIN OCCLUSION OF LEFT EYE WITH MACULAR EDEMA (H): ICD-10-CM

## 2022-10-12 DIAGNOSIS — H40.053 BORDERLINE GLAUCOMA OF BOTH EYES WITH OCULAR HYPERTENSION: ICD-10-CM

## 2022-10-12 RX ORDER — LATANOPROST 50 UG/ML
1 SOLUTION/ DROPS OPHTHALMIC AT BEDTIME
Qty: 2.5 ML | Refills: 11 | Status: SHIPPED | OUTPATIENT
Start: 2022-10-12 | End: 2022-12-19

## 2022-12-19 DIAGNOSIS — I10 HTN, GOAL BELOW 140/90: ICD-10-CM

## 2022-12-19 DIAGNOSIS — H40.053 BORDERLINE GLAUCOMA OF BOTH EYES WITH OCULAR HYPERTENSION: ICD-10-CM

## 2022-12-19 DIAGNOSIS — R00.2 PALPITATIONS: ICD-10-CM

## 2022-12-19 DIAGNOSIS — H34.8320 BRANCH RETINAL VEIN OCCLUSION OF LEFT EYE WITH MACULAR EDEMA (H): ICD-10-CM

## 2022-12-19 RX ORDER — LISINOPRIL 10 MG/1
10 TABLET ORAL DAILY
Qty: 90 TABLET | Refills: 0 | Status: SHIPPED | OUTPATIENT
Start: 2022-12-19 | End: 2023-04-14

## 2022-12-19 RX ORDER — LATANOPROST 50 UG/ML
1 SOLUTION/ DROPS OPHTHALMIC AT BEDTIME
Qty: 2.5 ML | Refills: 11 | Status: SHIPPED | OUTPATIENT
Start: 2022-12-19 | End: 2023-11-10

## 2022-12-19 RX ORDER — METOPROLOL SUCCINATE 50 MG/1
TABLET, EXTENDED RELEASE ORAL
Qty: 90 TABLET | Refills: 1 | Status: SHIPPED | OUTPATIENT
Start: 2022-12-19 | End: 2023-04-28

## 2023-04-13 DIAGNOSIS — I10 HTN, GOAL BELOW 140/90: ICD-10-CM

## 2023-04-14 RX ORDER — LISINOPRIL 10 MG/1
10 TABLET ORAL DAILY
Qty: 90 TABLET | Refills: 0 | Status: SHIPPED | OUTPATIENT
Start: 2023-04-14 | End: 2023-04-28

## 2023-04-23 ENCOUNTER — HEALTH MAINTENANCE LETTER (OUTPATIENT)
Age: 53
End: 2023-04-23

## 2023-04-28 ENCOUNTER — OFFICE VISIT (OUTPATIENT)
Dept: FAMILY MEDICINE | Facility: CLINIC | Age: 53
End: 2023-04-28
Payer: COMMERCIAL

## 2023-04-28 VITALS
OXYGEN SATURATION: 100 % | WEIGHT: 192 LBS | HEIGHT: 66 IN | RESPIRATION RATE: 18 BRPM | TEMPERATURE: 97.2 F | BODY MASS INDEX: 30.86 KG/M2 | HEART RATE: 51 BPM | SYSTOLIC BLOOD PRESSURE: 131 MMHG | DIASTOLIC BLOOD PRESSURE: 82 MMHG

## 2023-04-28 DIAGNOSIS — I10 HTN, GOAL BELOW 140/90: Primary | ICD-10-CM

## 2023-04-28 DIAGNOSIS — R00.2 PALPITATIONS: ICD-10-CM

## 2023-04-28 DIAGNOSIS — M94.0 COSTOCHONDRITIS: ICD-10-CM

## 2023-04-28 LAB
ANION GAP SERPL CALCULATED.3IONS-SCNC: 2 MMOL/L (ref 3–14)
BASOPHILS # BLD AUTO: 0 10E3/UL (ref 0–0.2)
BASOPHILS NFR BLD AUTO: 0 %
BUN SERPL-MCNC: 24 MG/DL (ref 7–30)
CALCIUM SERPL-MCNC: 8.9 MG/DL (ref 8.5–10.1)
CHLORIDE BLD-SCNC: 105 MMOL/L (ref 94–109)
CHOLEST SERPL-MCNC: 231 MG/DL
CO2 SERPL-SCNC: 29 MMOL/L (ref 20–32)
CREAT SERPL-MCNC: 1.25 MG/DL (ref 0.66–1.25)
EOSINOPHIL # BLD AUTO: 0.1 10E3/UL (ref 0–0.7)
EOSINOPHIL NFR BLD AUTO: 2 %
ERYTHROCYTE [DISTWIDTH] IN BLOOD BY AUTOMATED COUNT: 14.7 % (ref 10–15)
FASTING STATUS PATIENT QL REPORTED: YES
GFR SERPL CREATININE-BSD FRML MDRD: 69 ML/MIN/1.73M2
GLUCOSE BLD-MCNC: 97 MG/DL (ref 70–99)
HCT VFR BLD AUTO: 40.4 % (ref 40–53)
HDLC SERPL-MCNC: 49 MG/DL
HGB BLD-MCNC: 13.6 G/DL (ref 13.3–17.7)
LDLC SERPL CALC-MCNC: 170 MG/DL
LYMPHOCYTES # BLD AUTO: 1.6 10E3/UL (ref 0.8–5.3)
LYMPHOCYTES NFR BLD AUTO: 47 %
MCH RBC QN AUTO: 30.2 PG (ref 26.5–33)
MCHC RBC AUTO-ENTMCNC: 33.7 G/DL (ref 31.5–36.5)
MCV RBC AUTO: 90 FL (ref 78–100)
MONOCYTES # BLD AUTO: 0.3 10E3/UL (ref 0–1.3)
MONOCYTES NFR BLD AUTO: 9 %
NEUTROPHILS # BLD AUTO: 1.4 10E3/UL (ref 1.6–8.3)
NEUTROPHILS NFR BLD AUTO: 42 %
NONHDLC SERPL-MCNC: 182 MG/DL
PLATELET # BLD AUTO: 179 10E3/UL (ref 150–450)
POTASSIUM BLD-SCNC: 4.7 MMOL/L (ref 3.4–5.3)
RBC # BLD AUTO: 4.5 10E6/UL (ref 4.4–5.9)
SODIUM SERPL-SCNC: 136 MMOL/L (ref 133–144)
TRIGL SERPL-MCNC: 58 MG/DL
WBC # BLD AUTO: 3.3 10E3/UL (ref 4–11)

## 2023-04-28 PROCEDURE — 36415 COLL VENOUS BLD VENIPUNCTURE: CPT | Performed by: INTERNAL MEDICINE

## 2023-04-28 PROCEDURE — 93000 ELECTROCARDIOGRAM COMPLETE: CPT | Performed by: INTERNAL MEDICINE

## 2023-04-28 PROCEDURE — 80061 LIPID PANEL: CPT | Performed by: INTERNAL MEDICINE

## 2023-04-28 PROCEDURE — 80048 BASIC METABOLIC PNL TOTAL CA: CPT | Performed by: INTERNAL MEDICINE

## 2023-04-28 PROCEDURE — 99396 PREV VISIT EST AGE 40-64: CPT | Performed by: INTERNAL MEDICINE

## 2023-04-28 PROCEDURE — 99213 OFFICE O/P EST LOW 20 MIN: CPT | Mod: 25 | Performed by: INTERNAL MEDICINE

## 2023-04-28 PROCEDURE — 85025 COMPLETE CBC W/AUTO DIFF WBC: CPT | Performed by: INTERNAL MEDICINE

## 2023-04-28 RX ORDER — LISINOPRIL 10 MG/1
10 TABLET ORAL DAILY
Qty: 90 TABLET | Refills: 4 | Status: SHIPPED | OUTPATIENT
Start: 2023-04-28 | End: 2024-06-10

## 2023-04-28 RX ORDER — METOPROLOL SUCCINATE 50 MG/1
50 TABLET, EXTENDED RELEASE ORAL DAILY
Qty: 90 TABLET | Refills: 4 | Status: SHIPPED | OUTPATIENT
Start: 2023-04-28 | End: 2024-06-10

## 2023-04-28 ASSESSMENT — ENCOUNTER SYMPTOMS
FREQUENCY: 0
DIZZINESS: 0
MYALGIAS: 1
NAUSEA: 0
DIARRHEA: 0
FEVER: 0
NERVOUS/ANXIOUS: 0
SHORTNESS OF BREATH: 0
HEMATOCHEZIA: 0
HEADACHES: 0
JOINT SWELLING: 0
ABDOMINAL PAIN: 0
CONSTIPATION: 0
EYE PAIN: 0
COUGH: 0
SORE THROAT: 0
CHILLS: 0
DYSURIA: 0
ARTHRALGIAS: 0
HEMATURIA: 0
HEARTBURN: 0
PARESTHESIAS: 0
PALPITATIONS: 0
WEAKNESS: 0

## 2023-04-28 NOTE — PROGRESS NOTES
SUBJECTIVE:   CC: Cornel is an 52 year old who presents for preventative health visit.       4/28/2023     9:35 AM   Additional Questions   Roomed by Guadalupe       Healthy Habits:     Getting at least 3 servings of Calcium per day:  Yes    Bi-annual eye exam:  Yes    Dental care twice a year:  NO    Sleep apnea or symptoms of sleep apnea:  Excessive snoring    Diet:  Low salt    Frequency of exercise:  2-3 days/week    Duration of exercise:  Less than 15 minutes    Taking medications regularly:  Yes    Medication side effects:  None    PHQ-2 Total Score: 0    Additional concerns today:  No            Today's PHQ-2 Score:       4/28/2023     9:29 AM   PHQ-2 ( 1999 [employer])   Q1: Little interest or pleasure in doing things 0   Q2: Feeling down, depressed or hopeless 0   PHQ-2 Score 0   Q1: Little interest or pleasure in doing things Not at all    Not at all   Q2: Feeling down, depressed or hopeless Not at all    Not at all   PHQ-2 Score 0    0           Social History     Tobacco Use     Smoking status: Never     Passive exposure: Never     Smokeless tobacco: Never     Tobacco comments:     smoke free household.   Vaping Use     Vaping status: Never Used   Substance Use Topics     Alcohol use: No             4/28/2023     9:29 AM   Alcohol Use   Prescreen: >3 drinks/day or >7 drinks/week? No       Last PSA:   PSA   Date Value Ref Range Status   04/27/2009 0.82 0 - 4 ug/L Final       Reviewed orders with patient. Reviewed health maintenance and updated orders accordingly - Yes  Lab work is in process  Labs reviewed in EPIC  BP Readings from Last 3 Encounters:   04/28/23 131/82   06/06/22 132/82   12/10/21 139/82    Wt Readings from Last 3 Encounters:   04/28/23 87.1 kg (192 lb)   06/06/22 84.8 kg (187 lb)   12/10/21 83.9 kg (185 lb)                  Patient Active Problem List   Diagnosis     CARDIOVASCULAR SCREENING; LDL GOAL LESS THAN 160     Preglaucoma     Vitamin D deficiency disease     Cup to disc asymmetry,  bilateral     Branch retinal vein occlusion of left eye with macular edema- sees VRS Dr Marisa Chavez     HTN, goal below 140/90     Cervical neuralgia     Past Surgical History:   Procedure Laterality Date     AVASTIN (BEVACIZUMAB) 1.25 MG INTRAVITREAL INJECTION OS (LEFT EYE) Left     4-15-19 Dr Marisa Chavez, [facility]       Social History     Tobacco Use     Smoking status: Never     Passive exposure: Never     Smokeless tobacco: Never     Tobacco comments:     smoke free household.   Vaping Use     Vaping status: Never Used   Substance Use Topics     Alcohol use: No     Family History   Problem Relation Age of Onset     Hypertension Father      Family History Negative Other      Cancer No family hx of      Diabetes No family hx of      Cerebrovascular Disease No family hx of      Thyroid Disease No family hx of      Glaucoma No family hx of      Macular Degeneration No family hx of          Current Outpatient Medications   Medication Sig Dispense Refill     latanoprost (XALATAN) 0.005 % ophthalmic solution Place 1 drop into both eyes At Bedtime 2.5 mL 11     lisinopril (ZESTRIL) 10 MG tablet Take 1 tablet (10 mg) by mouth daily 90 tablet 4     metoprolol succinate ER (TOPROL XL) 50 MG 24 hr tablet Take 1 tablet (50 mg) by mouth daily 90 tablet 4     No Known Allergies    Reviewed and updated as needed this visit by clinical staff   Tobacco  Allergies  Meds              Reviewed and updated as needed this visit by Provider                   In the pectoral region in the motning   Lots of pushing and pulling   Substernal when stretching   Ibuprofen a few days -  Not sure if helped   Not exertional     Review of Systems   Constitutional: Negative for chills and fever.   HENT: Negative for congestion, ear pain, hearing loss and sore throat.    Eyes: Negative for pain and visual disturbance.   Respiratory: Negative for cough and shortness of breath.    Cardiovascular: Positive for chest pain. Negative for palpitations and  "peripheral edema.   Gastrointestinal: Negative for abdominal pain, constipation, diarrhea, heartburn, hematochezia and nausea.   Genitourinary: Negative for dysuria, frequency, genital sores, hematuria, impotence, penile discharge and urgency.   Musculoskeletal: Positive for myalgias. Negative for arthralgias and joint swelling.   Skin: Negative for rash.   Neurological: Negative for dizziness, weakness, headaches and paresthesias.   Psychiatric/Behavioral: Negative for mood changes. The patient is not nervous/anxious.      CONSTITUTIONAL: NEGATIVE for fever, chills, change in weight  INTEGUMENTARY/SKIN: NEGATIVE for worrisome rashes, moles or lesions  EYES: NEGATIVE for vision changes or irritation  ENT: NEGATIVE for ear, mouth and throat problems  RESP: NEGATIVE for significant cough or SOB  CV: NEGATIVE for chest pain, palpitations or peripheral edema  GI: NEGATIVE for nausea, abdominal pain, heartburn, or change in bowel habits   male: negative for dysuria, hematuria, decreased urinary stream, erectile dysfunction, urethral discharge  MUSCULOSKELETAL: NEGATIVE for significant arthralgias or myalgia  NEURO: NEGATIVE for weakness, dizziness or paresthesias  PSYCHIATRIC: NEGATIVE for changes in mood or affect    OBJECTIVE:   /82 (BP Location: Right arm, Patient Position: Chair, Cuff Size: Adult Large)   Pulse 51   Temp 97.2  F (36.2  C)   Resp 18   Ht 1.676 m (5' 6\")   Wt 87.1 kg (192 lb)   SpO2 100%   BMI 30.99 kg/m      Physical Exam  GENERAL: healthy, alert and no distress  EYES: Eyes grossly normal to inspection, PERRL and conjunctivae and sclerae normal  HENT: ear canals and TM's normal, nose and mouth without ulcers or lesions  NECK: no adenopathy, no asymmetry, masses, or scars and thyroid normal to palpation  RESP: lungs clear to auscultation - no rales, rhonchi or wheezes  CV: regular rate and rhythm, normal S1 S2, no S3 or S4, no murmur, click or rub, no peripheral edema and peripheral " pulses strong  ABDOMEN: soft, nontender, no hepatosplenomegaly, no masses and bowel sounds normal  MS: no gross musculoskeletal defects noted, no edema  SKIN: no suspicious lesions or rashes  NEURO: Normal strength and tone, mentation intact and speech normal  BACK: no CVA tenderness, no paralumbar tenderness  PSYCH: mentation appears normal, affect normal/bright  LYMPH: no cervical, supraclavicular, axillary, or inguinal adenopathy    Diagnostic Test Results:  Labs reviewed in Epic  Results for orders placed or performed in visit on 04/28/23   CBC with platelets and differential     Status: Abnormal   Result Value Ref Range    WBC Count 3.3 (L) 4.0 - 11.0 10e3/uL    RBC Count 4.50 4.40 - 5.90 10e6/uL    Hemoglobin 13.6 13.3 - 17.7 g/dL    Hematocrit 40.4 40.0 - 53.0 %    MCV 90 78 - 100 fL    MCH 30.2 26.5 - 33.0 pg    MCHC 33.7 31.5 - 36.5 g/dL    RDW 14.7 10.0 - 15.0 %    Platelet Count 179 150 - 450 10e3/uL    % Neutrophils 42 %    % Lymphocytes 47 %    % Monocytes 9 %    % Eosinophils 2 %    % Basophils 0 %    Absolute Neutrophils 1.4 (L) 1.6 - 8.3 10e3/uL    Absolute Lymphocytes 1.6 0.8 - 5.3 10e3/uL    Absolute Monocytes 0.3 0.0 - 1.3 10e3/uL    Absolute Eosinophils 0.1 0.0 - 0.7 10e3/uL    Absolute Basophils 0.0 0.0 - 0.2 10e3/uL   CBC with platelets and differential     Status: Abnormal    Narrative    The following orders were created for panel order CBC with platelets and differential.  Procedure                               Abnormality         Status                     ---------                               -----------         ------                     CBC with platelets and d...[[id number]]  Abnormal            Final result                 Please view results for these tests on the individual orders.       ASSESSMENT/PLAN:   Cornel was seen today for physical.    Diagnoses and all orders for this visit:    HTN, goal below 140/90  -     BASIC METABOLIC PANEL; Future  -     Adult Eye   Referral; Future  -     Lipid panel reflex to direct LDL Fasting; Future  -     CBC with platelets and differential; Future  -     lisinopril (ZESTRIL) 10 MG tablet; Take 1 tablet (10 mg) by mouth daily  -     BASIC METABOLIC PANEL  -     Lipid panel reflex to direct LDL Fasting  -     CBC with platelets and differential    Palpitations  -     EKG 12-lead complete w/read - Clinics  -     metoprolol succinate ER (TOPROL XL) 50 MG 24 hr tablet; Take 1 tablet (50 mg) by mouth daily    Costochondritis    Other orders  -     REVIEW OF HEALTH MAINTENANCE PROTOCOL ORDERS              COUNSELING:   Reviewed preventive health counseling, as reflected in patient instructions       Regular exercise       Healthy diet/nutrition       Vision screening       Hearing screening       Colorectal cancer screening        He reports that he has never smoked. He has never been exposed to tobacco smoke. He has never used smokeless tobacco.            Nathan Carson MD  [facility]

## 2023-05-04 ENCOUNTER — OFFICE VISIT (OUTPATIENT)
Dept: OPHTHALMOLOGY | Facility: CLINIC | Age: 53
End: 2023-05-04
Payer: COMMERCIAL

## 2023-05-04 DIAGNOSIS — H40.053 BORDERLINE GLAUCOMA OF BOTH EYES WITH OCULAR HYPERTENSION: Primary | ICD-10-CM

## 2023-05-04 DIAGNOSIS — H52.13 MYOPIA OF BOTH EYES WITH REGULAR ASTIGMATISM AND PRESBYOPIA: ICD-10-CM

## 2023-05-04 DIAGNOSIS — H34.8320 BRANCH RETINAL VEIN OCCLUSION OF LEFT EYE WITH MACULAR EDEMA (H): ICD-10-CM

## 2023-05-04 DIAGNOSIS — H52.4 MYOPIA OF BOTH EYES WITH REGULAR ASTIGMATISM AND PRESBYOPIA: ICD-10-CM

## 2023-05-04 DIAGNOSIS — H52.223 MYOPIA OF BOTH EYES WITH REGULAR ASTIGMATISM AND PRESBYOPIA: ICD-10-CM

## 2023-05-04 PROCEDURE — 92015 DETERMINE REFRACTIVE STATE: CPT | Performed by: STUDENT IN AN ORGANIZED HEALTH CARE EDUCATION/TRAINING PROGRAM

## 2023-05-04 PROCEDURE — 92014 COMPRE OPH EXAM EST PT 1/>: CPT | Performed by: STUDENT IN AN ORGANIZED HEALTH CARE EDUCATION/TRAINING PROGRAM

## 2023-05-04 ASSESSMENT — VISUAL ACUITY
OD_SC: 20/20
OD_SC+: -1
METHOD: SNELLEN - LINEAR
OS_SC: 20/25
OS_SC+: -1

## 2023-05-04 ASSESSMENT — EXTERNAL EXAM - RIGHT EYE: OD_EXAM: NORMAL

## 2023-05-04 ASSESSMENT — CONF VISUAL FIELD
OS_SUPERIOR_TEMPORAL_RESTRICTION: 0
OS_INFERIOR_NASAL_RESTRICTION: 0
OD_SUPERIOR_NASAL_RESTRICTION: 0
OD_INFERIOR_TEMPORAL_RESTRICTION: 0
OD_INFERIOR_NASAL_RESTRICTION: 0
OS_NORMAL: 1
OD_NORMAL: 1
OS_INFERIOR_TEMPORAL_RESTRICTION: 0
OD_SUPERIOR_TEMPORAL_RESTRICTION: 0
METHOD: COUNTING FINGERS
OS_SUPERIOR_NASAL_RESTRICTION: 0

## 2023-05-04 ASSESSMENT — REFRACTION_MANIFEST
OD_AXIS: 170
OD_ADD: +2.25
OD_CYLINDER: +0.75
OS_SPHERE: -1.00
OS_CYLINDER: +1.25
OS_ADD: +2.25
OS_AXIS: 025
OD_SPHERE: -0.75

## 2023-05-04 ASSESSMENT — EXTERNAL EXAM - LEFT EYE: OS_EXAM: NORMAL

## 2023-05-04 ASSESSMENT — TONOMETRY
OS_IOP_MMHG: 21
IOP_METHOD: APPLANATION
OD_IOP_MMHG: 21

## 2023-05-04 ASSESSMENT — CUP TO DISC RATIO
OD_RATIO: 0.5
OS_RATIO: 0.5

## 2023-05-04 ASSESSMENT — SLIT LAMP EXAM - LIDS
COMMENTS: 2+ SCLERAL SHOW INFERIOR
COMMENTS: 2+ SCLERAL SHOW INFERIOR

## 2023-05-04 NOTE — LETTER
5/4/2023         RE: Cornel Monte  [address] [facility] [address]        Dear Colleague,    Thank you for referring your patient, Cornel Monte, to the [facility]. Please see a copy of my visit note below.     Current Eye Medications:  Latanoprost at bedtime both eyes, last took at 11 pm.      Subjective:  Complete eye exam. Vision is doing well both eyes in the distance. Uses OTC + 1.75 and they work OK for reading. No eye pain or discomfort in either eye.      Objective:  See Ophthalmology Exam.       Assessment:  Cornel Monte is a 52 year old male who presents with:   Encounter Diagnoses   Name Primary?     Borderline glaucoma of both eyes with ocular hypertension Intraocular pressure 21/21 today. Continue same medication.     H/o Branch retinal vein occlusion of left eye with macular edema- sees VRS Dr Marisa Chavez      Myopia of both eyes with regular astigmatism and presbyopia        Plan:  Continue Latanoprost (green top) at bedtime both eyes     Continue over the counter readers or can fill glasses prescription given     Bhavesh Kim MD  [phone]          Again, thank you for allowing me to participate in the care of your patient.        Sincerely,        Bhavesh Kim MD

## 2023-05-04 NOTE — PATIENT INSTRUCTIONS
Continue Latanoprost (green top) at bedtime both eyes     Continue over the counter readers or can fill glasses prescription given     Bhavesh Kim MD  [phone]

## 2023-05-04 NOTE — PROGRESS NOTES
Current Eye Medications:  Latanoprost at bedtime both eyes, last took at 11 pm.      Subjective:  Complete eye exam. Vision is doing well both eyes in the distance. Uses OTC + 1.75 and they work OK for reading. No eye pain or discomfort in either eye.      Objective:  See Ophthalmology Exam.       Assessment:  Cornel Monte is a 52 year old male who presents with:   Encounter Diagnoses   Name Primary?     Borderline glaucoma of both eyes with ocular hypertension Intraocular pressure 21/21 today. Continue same medication.     H/o Branch retinal vein occlusion of left eye with macular edema- sees VRS Dr Marisa Chavez      Myopia of both eyes with regular astigmatism and presbyopia        Plan:  Continue Latanoprost (green top) at bedtime both eyes     Continue over the counter readers or can fill glasses prescription given     Bhavesh Kim MD  [phone]

## 2023-06-09 ENCOUNTER — MYC MEDICAL ADVICE (OUTPATIENT)
Dept: FAMILY MEDICINE | Facility: CLINIC | Age: 53
End: 2023-06-09

## 2023-06-09 NOTE — TELEPHONE ENCOUNTER
Please see patient mychart message regarding recurrent chest pain. Patient seen in office visit 4/28/23 for same symptoms and told to return in 3 months for follow-up. Patient asking for referral to cardiology. Patient also notes neck pain possibly related.    Writer did message patient to discuss that if chest pain lasts or becomes severe to proceed to ED.    Routing for PCP to please advise.    VIKY Bashir RN  [facility]

## 2023-08-17 ENCOUNTER — MYC MEDICAL ADVICE (OUTPATIENT)
Dept: FAMILY MEDICINE | Facility: CLINIC | Age: 53
End: 2023-08-17
Payer: COMMERCIAL

## 2023-08-18 ENCOUNTER — OFFICE VISIT (OUTPATIENT)
Dept: URGENT CARE | Facility: URGENT CARE | Age: 53
End: 2023-08-18
Payer: COMMERCIAL

## 2023-08-18 VITALS
WEIGHT: 189 LBS | DIASTOLIC BLOOD PRESSURE: 84 MMHG | BODY MASS INDEX: 30.51 KG/M2 | RESPIRATION RATE: 19 BRPM | TEMPERATURE: 97.8 F | HEART RATE: 85 BPM | SYSTOLIC BLOOD PRESSURE: 185 MMHG | OXYGEN SATURATION: 99 %

## 2023-08-18 DIAGNOSIS — R10.9 FLANK PAIN: Primary | ICD-10-CM

## 2023-08-18 DIAGNOSIS — R03.0 ELEVATED BLOOD PRESSURE READING: ICD-10-CM

## 2023-08-18 DIAGNOSIS — S39.012A STRAIN OF LUMBAR REGION, INITIAL ENCOUNTER: ICD-10-CM

## 2023-08-18 LAB
ALBUMIN UR-MCNC: NEGATIVE MG/DL
APPEARANCE UR: CLEAR
BILIRUB UR QL STRIP: NEGATIVE
COLOR UR AUTO: YELLOW
GLUCOSE UR STRIP-MCNC: NEGATIVE MG/DL
HGB UR QL STRIP: NEGATIVE
KETONES UR STRIP-MCNC: NEGATIVE MG/DL
LEUKOCYTE ESTERASE UR QL STRIP: NEGATIVE
NITRATE UR QL: NEGATIVE
PH UR STRIP: 5.5 [PH] (ref 5–7)
SP GR UR STRIP: 1.02 (ref 1–1.03)
UROBILINOGEN UR STRIP-ACNC: 2 E.U./DL

## 2023-08-18 PROCEDURE — 99213 OFFICE O/P EST LOW 20 MIN: CPT | Performed by: PHYSICIAN ASSISTANT

## 2023-08-18 PROCEDURE — 81003 URINALYSIS AUTO W/O SCOPE: CPT | Performed by: PHYSICIAN ASSISTANT

## 2023-08-18 RX ORDER — IBUPROFEN 800 MG/1
800 TABLET, FILM COATED ORAL EVERY 8 HOURS PRN
Qty: 30 TABLET | Refills: 0 | Status: SHIPPED | OUTPATIENT
Start: 2023-08-18 | End: 2023-09-07

## 2023-08-18 ASSESSMENT — ENCOUNTER SYMPTOMS
DYSURIA: 0
FEVER: 0
BACK PAIN: 1

## 2023-08-18 NOTE — PROGRESS NOTES
SUBJECTIVE:   Cornel Monte is a 52 year old male presenting with a chief complaint of   Chief Complaint   Patient presents with    Urgent Care     Present for right lower back pain that started last Friday.       He is an established patient of [facility].  Patient presents with RLB pain x 7 days.  No dysuria and no hx of nephrolithiasis.  No known back problems and no trauma.  Pain upon flexion and extension.  Forgot to take blood pressure medication this morning.      Treatment:  ibuprofen 2 pills per day x 3 days.  Helps somewhat.      Review of Systems   Constitutional:  Negative for fever.   Genitourinary:  Negative for dysuria.   Musculoskeletal:  Positive for back pain.   Skin:  Negative for rash.   All other systems reviewed and are negative.      Past Medical History:   Diagnosis Date    Arthritis     Glaucoma (increased eye pressure)     Glaucoma suspect, increased cup/disc, ou 1/6/2011    Hypertension     Macular degeneration     Mantoux: positive     TOOK INH 2009    Nonsenile cataract      Family History   Problem Relation Age of Onset    Hypertension Father     Family History Negative Other     Cancer No family hx of     Diabetes No family hx of     Cerebrovascular Disease No family hx of     Thyroid Disease No family hx of     Glaucoma No family hx of     Macular Degeneration No family hx of      Current Outpatient Medications   Medication Sig Dispense Refill    ibuprofen (ADVIL/MOTRIN) 800 MG tablet Take 1 tablet (800 mg) by mouth every 8 hours as needed for moderate pain 30 tablet 0    latanoprost (XALATAN) 0.005 % ophthalmic solution Place 1 drop into both eyes At Bedtime 2.5 mL 11    lisinopril (ZESTRIL) 10 MG tablet Take 1 tablet (10 mg) by mouth daily 90 tablet 4    metoprolol succinate ER (TOPROL XL) 50 MG 24 hr tablet Take 1 tablet (50 mg) by mouth daily 90 tablet 4     Social History     Tobacco Use    Smoking status: Never     Passive exposure: Never    Smokeless tobacco: Never     Tobacco comments:     smoke free household.   Substance Use Topics    Alcohol use: No       OBJECTIVE  BP (!) 185/84 (BP Location: Right arm, Patient Position: Sitting, Cuff Size: Adult Regular)   Pulse 85   Temp 97.8  F (36.6  C) (Tympanic)   Resp 19   Wt 85.7 kg (189 lb)   SpO2 99%   BMI 30.51 kg/m      Physical Exam  Vitals and nursing note reviewed.   Constitutional:       General: He is not in acute distress.     Appearance: Normal appearance. He is normal weight. He is not ill-appearing or toxic-appearing.   Eyes:      Extraocular Movements: Extraocular movements intact.      Conjunctiva/sclera: Conjunctivae normal.   Cardiovascular:      Rate and Rhythm: Normal rate.   Abdominal:      Tenderness: There is no right CVA tenderness or left CVA tenderness.   Musculoskeletal:      Comments: Patient is able to ambulate, heel walk, toe walk, flex, extend, side flex and twist normally.   Warmth to extremities.  Skin over back normal.  No tenderness to bony spine.  Bilateral PSIS without tenderness.       Neurological:      Mental Status: He is alert.         Labs:  Results for orders placed or performed in visit on 08/18/23 (from the past 24 hour(s))   UA Macroscopic with reflex to Microscopic and Culture - Lab Collect    Specimen: Urine, Clean Catch   Result Value Ref Range    Color Urine Yellow Colorless, Straw, Light Yellow, Yellow    Appearance Urine Clear Clear    Glucose Urine Negative Negative mg/dL    Bilirubin Urine Negative Negative    Ketones Urine Negative Negative mg/dL    Specific Gravity Urine 1.025 1.003 - 1.035    Blood Urine Negative Negative    pH Urine 5.5 5.0 - 7.0    Protein Albumin Urine Negative Negative mg/dL    Urobilinogen Urine 2.0 (A) 0.2, 1.0 E.U./dL    Nitrite Urine Negative Negative    Leukocyte Esterase Urine Negative Negative    Narrative    Microscopic not indicated       X-Ray was not done.    ASSESSMENT:      ICD-10-CM    1. Flank pain  R10.9 UA Macroscopic with reflex to  Microscopic and Culture - Lab Collect     UA Macroscopic with reflex to Microscopic and Culture - Lab Collect      2. Elevated blood pressure reading  R03.0       3. Strain of lumbar region, initial encounter  S39.012A ibuprofen (ADVIL/MOTRIN) 800 MG tablet     Physical Therapy Referral           Medical Decision Making:    Differential Diagnosis:  Back Pain: lumbosacral strain    Serious Comorbid Conditions:  Adult:   reviewed    PLAN:    Blood pressure to be repeated before discharge.  Patient asked to monitor blood pressure and follow up with PCP for management.    Rx for ibuprofen and rx for PT.  Discussed reasons to seek immediate medical attention.          Followup:    If not improving or if condition worsens, follow up with your Primary Care Provider, If not improving or if conditions worsens over the next 12-24 hours, go to the Emergency Department    There are no Patient Instructions on file for this visit.

## 2023-09-07 DIAGNOSIS — S39.012A STRAIN OF LUMBAR REGION, INITIAL ENCOUNTER: ICD-10-CM

## 2023-09-08 RX ORDER — IBUPROFEN 800 MG/1
800 TABLET, FILM COATED ORAL EVERY 8 HOURS PRN
Qty: 30 TABLET | Refills: 0 | Status: SHIPPED | OUTPATIENT
Start: 2023-09-08

## 2023-09-14 ENCOUNTER — MYC MEDICAL ADVICE (OUTPATIENT)
Dept: FAMILY MEDICINE | Facility: CLINIC | Age: 53
End: 2023-09-14
Payer: COMMERCIAL

## 2023-09-14 NOTE — TELEPHONE ENCOUNTER
Patient Quality Outreach    Patient is due for the following:   Hypertension -  Hypertension follow-up visit      Topic Date Due    Hepatitis B Vaccine (3 of 3 - 19+ 3-dose series) 01/06/2012    Zoster (Shingles) Vaccine (1 of 2) Never done    COVID-19 Vaccine (4 - Moderna series) 06/13/2022    Flu Vaccine (1) 09/01/2023       Next Steps:   Schedule a office visit for HTN Recheck    Type of outreach:    Sent [employer] message.      Questions for provider review:    None           Amalia Tinoco CMA  Chart routed to Care Team.

## 2023-09-26 NOTE — TELEPHONE ENCOUNTER
Patient Quality Outreach    Patient is due for the following:   Hypertension -  Hypertension follow-up visit    Next Steps:   Schedule a office visit for HTN Recheck     Type of outreach:    Phone, left message for patient/parent to call back.      Questions for provider review:    None           Amalia Tinoco CMA  Chart routed to Care Team.

## 2023-11-01 ENCOUNTER — TELEPHONE (OUTPATIENT)
Dept: OPHTHALMOLOGY | Facility: CLINIC | Age: 53
End: 2023-11-01
Payer: COMMERCIAL

## 2023-11-01 NOTE — TELEPHONE ENCOUNTER
[facility] Call Center    Phone Message    May a detailed message be left on voicemail: yes     Reason for Call: Form or Letter   Type or form/letter needing completion: Reading glasses rx  Provider:   Date form needed: asap  Once completed: Fax form to: [facility] Fax:[phone]    Action Taken: Message routed to:  [facility] eye    Travel Screening: Not Applicable

## 2023-11-10 ENCOUNTER — OFFICE VISIT (OUTPATIENT)
Dept: OPHTHALMOLOGY | Facility: CLINIC | Age: 53
End: 2023-11-10
Payer: COMMERCIAL

## 2023-11-10 DIAGNOSIS — H34.8320 BRANCH RETINAL VEIN OCCLUSION OF LEFT EYE WITH MACULAR EDEMA (H): ICD-10-CM

## 2023-11-10 DIAGNOSIS — H40.053 BORDERLINE GLAUCOMA OF BOTH EYES WITH OCULAR HYPERTENSION: ICD-10-CM

## 2023-11-10 PROCEDURE — 92083 EXTENDED VISUAL FIELD XM: CPT | Performed by: STUDENT IN AN ORGANIZED HEALTH CARE EDUCATION/TRAINING PROGRAM

## 2023-11-10 PROCEDURE — 92133 CPTRZD OPH DX IMG PST SGM ON: CPT | Performed by: STUDENT IN AN ORGANIZED HEALTH CARE EDUCATION/TRAINING PROGRAM

## 2023-11-10 PROCEDURE — 92012 INTRM OPH EXAM EST PATIENT: CPT | Performed by: STUDENT IN AN ORGANIZED HEALTH CARE EDUCATION/TRAINING PROGRAM

## 2023-11-10 RX ORDER — LATANOPROST 50 UG/ML
1 SOLUTION/ DROPS OPHTHALMIC AT BEDTIME
Qty: 2.5 ML | Refills: 11 | Status: SHIPPED | OUTPATIENT
Start: 2023-11-10

## 2023-11-10 ASSESSMENT — VISUAL ACUITY
OD_SC+: +2
OS_SC+: +2
OD_SC: 20/25
OS_SC: 20/25-2
METHOD: SNELLEN - LINEAR

## 2023-11-10 ASSESSMENT — EXTERNAL EXAM - RIGHT EYE: OD_EXAM: NORMAL

## 2023-11-10 ASSESSMENT — TONOMETRY
OS_IOP_MMHG: 15
OS_IOP_MMHG: 16
OD_IOP_MMHG: 13
OD_IOP_MMHG: 14
IOP_METHOD: APPLANATION
IOP_METHOD: ICARE

## 2023-11-10 ASSESSMENT — SLIT LAMP EXAM - LIDS
COMMENTS: 2+ SCLERAL SHOW INFERIOR
COMMENTS: 2+ SCLERAL SHOW INFERIOR

## 2023-11-10 ASSESSMENT — EXTERNAL EXAM - LEFT EYE: OS_EXAM: NORMAL

## 2023-11-10 NOTE — PROGRESS NOTES
Current Eye Medications:  latanoprost - both eyes at bedtime - last dose: 930pm last night.     Subjective:  here for HVF, OCT and IOP - mostly consistent with nightly use of drops. Vision is stable.     Objective:  See Ophthalmology Exam.       Assessment:  Cornel Monte is a 53 year old male who presents with:   Encounter Diagnoses   Name Primary?    Borderline glaucoma of both eyes with ocular hypertension Intraocular pressure 14/15 today. Continue same medication.    OCT optic nerve: avg retinal nerve fiber layer 89/99; within normal limits and stable both eyes     Davidson visual field (HVF) 24-2: mild scattered loss right eye; within normal limits left eye     Branch retinal vein occlusion of left eye with macular edema- sees VRS Dr Marisa Chavez        Plan:  Continue Latanoprost (green top) every evening both eyes     Bhavesh Kim MD  [phone]

## 2023-11-10 NOTE — LETTER
11/10/2023         RE: Cornel Monte  [address] [facility] [address]        Dear Colleague,    Thank you for referring your patient, Cornel Monte, to the [facility]. Please see a copy of my visit note below.     Current Eye Medications:  latanoprost - both eyes at bedtime - last dose: 930pm last night.     Subjective:  here for HVF, OCT and IOP - mostly consistent with nightly use of drops. Vision is stable.     Objective:  See Ophthalmology Exam.       Assessment:  Cornel Monte is a 53 year old male who presents with:   Encounter Diagnoses   Name Primary?     Borderline glaucoma of both eyes with ocular hypertension Intraocular pressure 14/15 today. Continue same medication.    OCT optic nerve: avg retinal nerve fiber layer 89/99; within normal limits and stable both eyes     Davidson visual field (HVF) 24-2: mild scattered loss right eye; within normal limits left eye      Branch retinal vein occlusion of left eye with macular edema- sees VRS Dr Marisa Chavez        Plan:  Continue Latanoprost (green top) every evening both eyes     Bhavesh Kim MD  [phone]       Again, thank you for allowing me to participate in the care of your patient.        Sincerely,        Bhavesh Kim MD

## 2024-01-03 ENCOUNTER — TELEPHONE (OUTPATIENT)
Dept: OPHTHALMOLOGY | Facility: CLINIC | Age: 54
End: 2024-01-03
Payer: COMMERCIAL

## 2024-01-03 NOTE — TELEPHONE ENCOUNTER
Spoke to patient. His insurance will not cover no line bifocals. Two optical shops also told him that he cannot purchase the no line and have the glasses go through insurance.    Told him he does not have any of the conditions that require a letter from the Doctor to cover the cost of the lenses. Explained he could get a pair of single vision glasses from the insurance either for distance or near. Then he would have to pay out of pocket for the No line bifocals. He understands and will proceed with that idea.

## 2024-01-03 NOTE — TELEPHONE ENCOUNTER
[facility] Call Center    Phone Message    May a detailed message be left on voicemail: yes     Reason for Call: Other: Pt calling about a message received from Rhonda about his eyeglass prescription. Pt states that there is some type of letter needed from the provider to provide to the insurance. Did read message from Rhonda to pt but still requesting additional information. Please review and contact pt to discuss. Thank you      Action Taken: Other: EYE    Travel Screening: Not Applicable

## 2024-03-07 ENCOUNTER — MYC MEDICAL ADVICE (OUTPATIENT)
Dept: FAMILY MEDICINE | Facility: CLINIC | Age: 54
End: 2024-03-07
Payer: COMMERCIAL

## 2024-03-07 NOTE — TELEPHONE ENCOUNTER
Patient Quality Outreach    Patient is due for the following:   Hypertension -  Hypertension follow-up visit      Topic Date Due    Polio Vaccine (3 of 3 - Adult catch-up series) 10/27/2009    Hepatitis B Vaccine (3 of 3 - 19+ 3-dose series) 01/06/2012    COVID-19 Vaccine (4 - 2023-24 season) 09/01/2023    Zoster (Shingles) Vaccine (2 of 2) 11/29/2023       Next Steps:   Schedule a office visit for HTN Recheck    Type of outreach:    Sent [employer] message.      Questions for provider review:    None           Amalia Tinoco CMA  Chart routed to Care Team.

## 2024-06-10 DIAGNOSIS — R00.2 PALPITATIONS: ICD-10-CM

## 2024-06-10 DIAGNOSIS — I10 HTN, GOAL BELOW 140/90: ICD-10-CM

## 2024-06-10 RX ORDER — LISINOPRIL 10 MG/1
10 TABLET ORAL DAILY
Qty: 90 TABLET | Refills: 2 | Status: SHIPPED | OUTPATIENT
Start: 2024-06-10

## 2024-06-10 RX ORDER — METOPROLOL SUCCINATE 50 MG/1
50 TABLET, EXTENDED RELEASE ORAL DAILY
Qty: 90 TABLET | Refills: 2 | Status: SHIPPED | OUTPATIENT
Start: 2024-06-10

## 2024-06-30 ENCOUNTER — HEALTH MAINTENANCE LETTER (OUTPATIENT)
Age: 54
End: 2024-06-30

## 2024-08-08 ENCOUNTER — TELEPHONE (OUTPATIENT)
Dept: FAMILY MEDICINE | Facility: CLINIC | Age: 54
End: 2024-08-08
Payer: COMMERCIAL

## 2024-08-08 NOTE — TELEPHONE ENCOUNTER
Prior Authorization Retail Medication Request    Medication/Dose: Latanoprost 0.005% eye drops  Diagnosis and ICD code (if different than what is on RX):    New/renewal/insurance change PA/secondary ins. PA:  Previously Tried and Failed:    Rationale:      Insurance   Primary: [employer]  Insurance ID:  [id number]    Secondary (if applicable):[place] Medicaid  Insurance ID:  [id number]    Thank You  Myrna Abraham, [facility]  [phone]

## 2024-08-12 NOTE — TELEPHONE ENCOUNTER
Central Prior Authorization Team   Phone: [phone]    PA Initiation    Medication: Latanoprost 0.005% eye drops  Insurance Company: [employer] Non-Specialty PA's - Phone [phone] Fax [phone]  Pharmacy Filling the Rx: [place], [place] - [address], [address]  Filling Pharmacy Phone: [phone]  Filling Pharmacy Fax:    Start Date: 8/12/2024

## 2024-08-12 NOTE — TELEPHONE ENCOUNTER
Prior Authorization Approval          Authorization Effective Date: 7/13/2024  Authorization Expiration Date: 8/12/2025  Medication: Latanoprost 0.005% eye drops-PA APPROVED   Approved Dose/Quantity:   Reference #:     Insurance Company: [employer]-Specialty PA's - Phone [phone] Fax [phone]  Expected CoPay:       CoPay Card Available:      Foundation Assistance Needed:    Which Pharmacy is filling the prescription (Not needed for infusion/clinic administered): [facility] [place] [address], [address]  Pharmacy Notified:  Yes  Patient Notified:  No

## 2024-11-18 ENCOUNTER — IMMUNIZATION (OUTPATIENT)
Dept: FAMILY MEDICINE | Facility: CLINIC | Age: 54
End: 2024-11-18
Payer: COMMERCIAL

## 2024-11-18 PROCEDURE — 90471 IMMUNIZATION ADMIN: CPT

## 2024-11-18 PROCEDURE — 90673 RIV3 VACCINE NO PRESERV IM: CPT

## 2025-01-30 ENCOUNTER — MYC MEDICAL ADVICE (OUTPATIENT)
Dept: FAMILY MEDICINE | Facility: CLINIC | Age: 55
End: 2025-01-30
Payer: COMMERCIAL

## 2025-01-30 NOTE — TELEPHONE ENCOUNTER
Patient Quality Outreach    Patient is due for the following:   Diabetes -  Eye Exam  Hypertension -  Hypertension follow-up visit      Topic Date Due    Hepatitis B Vaccine (3 of 3 - 19+ 3-dose series) 01/06/2012    Pneumococcal Vaccine (1 of 1 - PCV) Never done    COVID-19 Vaccine (4 - 2024-25 season) 09/01/2024       Action(s) Taken:   Schedule a office visit for HTN recheck    Type of outreach:    Sent [employer] message.    Questions for provider review:    None           Amalia Tinoco CMA  Chart routed to Care Team.